# Patient Record
Sex: MALE | Race: WHITE | NOT HISPANIC OR LATINO
[De-identification: names, ages, dates, MRNs, and addresses within clinical notes are randomized per-mention and may not be internally consistent; named-entity substitution may affect disease eponyms.]

---

## 2017-05-18 ENCOUNTER — APPOINTMENT (OUTPATIENT)
Dept: CARDIOLOGY | Facility: CLINIC | Age: 77
End: 2017-05-18

## 2017-05-26 ENCOUNTER — APPOINTMENT (OUTPATIENT)
Dept: CARDIOLOGY | Facility: CLINIC | Age: 77
End: 2017-05-26

## 2017-09-05 ENCOUNTER — APPOINTMENT (OUTPATIENT)
Dept: CARDIOLOGY | Facility: CLINIC | Age: 77
End: 2017-09-05
Payer: MEDICARE

## 2017-09-05 PROCEDURE — 93000 ELECTROCARDIOGRAM COMPLETE: CPT

## 2017-09-05 PROCEDURE — 99214 OFFICE O/P EST MOD 30 MIN: CPT

## 2017-09-13 ENCOUNTER — APPOINTMENT (OUTPATIENT)
Dept: CARDIOLOGY | Facility: CLINIC | Age: 77
End: 2017-09-13
Payer: MEDICARE

## 2017-09-13 PROCEDURE — 93000 ELECTROCARDIOGRAM COMPLETE: CPT

## 2017-09-13 PROCEDURE — 99214 OFFICE O/P EST MOD 30 MIN: CPT

## 2017-09-26 ENCOUNTER — APPOINTMENT (OUTPATIENT)
Dept: CARDIOLOGY | Facility: CLINIC | Age: 77
End: 2017-09-26
Payer: MEDICARE

## 2017-09-26 PROCEDURE — 93979 VASCULAR STUDY: CPT

## 2017-09-26 PROCEDURE — 93015 CV STRESS TEST SUPVJ I&R: CPT

## 2017-09-26 PROCEDURE — 93306 TTE W/DOPPLER COMPLETE: CPT

## 2017-09-26 PROCEDURE — 93880 EXTRACRANIAL BILAT STUDY: CPT

## 2017-09-26 PROCEDURE — A9502: CPT

## 2017-09-26 PROCEDURE — 78452 HT MUSCLE IMAGE SPECT MULT: CPT

## 2017-10-06 ENCOUNTER — APPOINTMENT (OUTPATIENT)
Dept: CARDIOLOGY | Facility: CLINIC | Age: 77
End: 2017-10-06
Payer: MEDICARE

## 2017-10-06 PROCEDURE — 99214 OFFICE O/P EST MOD 30 MIN: CPT

## 2017-10-13 ENCOUNTER — APPOINTMENT (OUTPATIENT)
Dept: CARDIOLOGY | Facility: CLINIC | Age: 77
End: 2017-10-13
Payer: MEDICARE

## 2017-10-13 PROCEDURE — 99213 OFFICE O/P EST LOW 20 MIN: CPT

## 2017-11-10 ENCOUNTER — RX RENEWAL (OUTPATIENT)
Age: 77
End: 2017-11-10

## 2017-11-27 ENCOUNTER — APPOINTMENT (OUTPATIENT)
Dept: CARDIOLOGY | Facility: CLINIC | Age: 77
End: 2017-11-27

## 2017-11-29 ENCOUNTER — RECORD ABSTRACTING (OUTPATIENT)
Age: 77
End: 2017-11-29

## 2017-11-29 DIAGNOSIS — Z87.891 PERSONAL HISTORY OF NICOTINE DEPENDENCE: ICD-10-CM

## 2017-11-29 DIAGNOSIS — Z80.3 FAMILY HISTORY OF MALIGNANT NEOPLASM OF BREAST: ICD-10-CM

## 2017-11-29 DIAGNOSIS — M19.90 UNSPECIFIED OSTEOARTHRITIS, UNSPECIFIED SITE: ICD-10-CM

## 2017-11-29 DIAGNOSIS — Z80.1 FAMILY HISTORY OF MALIGNANT NEOPLASM OF TRACHEA, BRONCHUS AND LUNG: ICD-10-CM

## 2017-11-29 DIAGNOSIS — Z86.79 PERSONAL HISTORY OF OTHER DISEASES OF THE CIRCULATORY SYSTEM: ICD-10-CM

## 2017-11-29 DIAGNOSIS — Z86.39 PERSONAL HISTORY OF OTHER ENDOCRINE, NUTRITIONAL AND METABOLIC DISEASE: ICD-10-CM

## 2017-11-29 DIAGNOSIS — G43.909 MIGRAINE, UNSPECIFIED, NOT INTRACTABLE, W/OUT STATUS MIGRAINOSUS: ICD-10-CM

## 2017-11-29 DIAGNOSIS — I36.1 NONRHEUMATIC TRICUSPID (VALVE) INSUFFICIENCY: ICD-10-CM

## 2017-11-29 DIAGNOSIS — I34.0 NONRHEUMATIC MITRAL (VALVE) INSUFFICIENCY: ICD-10-CM

## 2017-11-29 DIAGNOSIS — Z87.09 PERSONAL HISTORY OF OTHER DISEASES OF THE RESPIRATORY SYSTEM: ICD-10-CM

## 2017-11-29 DIAGNOSIS — Z82.49 FAMILY HISTORY OF ISCHEMIC HEART DISEASE AND OTHER DISEASES OF THE CIRCULATORY SYSTEM: ICD-10-CM

## 2017-11-29 RX ORDER — ALBUTEROL SULFATE 90 UG/1
108 (90 BASE) AEROSOL, METERED RESPIRATORY (INHALATION)
Refills: 0 | Status: ACTIVE | COMMUNITY

## 2017-11-29 RX ORDER — UBIDECARENONE/VIT E ACET 100MG-5
100 CAPSULE ORAL
Refills: 0 | Status: ACTIVE | COMMUNITY

## 2017-11-29 RX ORDER — ASCORBIC ACID 125 MG
306 TABLET,CHEWABLE ORAL
Refills: 0 | Status: ACTIVE | COMMUNITY

## 2017-11-29 RX ORDER — MULTIVIT-MIN/FOLIC/VIT K/LYCOP 400-300MCG
1000 TABLET ORAL DAILY
Refills: 0 | Status: ACTIVE | COMMUNITY

## 2017-11-29 RX ORDER — MONTELUKAST SODIUM 10 MG/1
10 TABLET, FILM COATED ORAL DAILY
Refills: 0 | Status: ACTIVE | COMMUNITY

## 2017-11-30 ENCOUNTER — OUTPATIENT (OUTPATIENT)
Dept: OUTPATIENT SERVICES | Facility: HOSPITAL | Age: 77
LOS: 1 days | End: 2017-11-30

## 2017-12-01 ENCOUNTER — APPOINTMENT (OUTPATIENT)
Dept: CARDIOLOGY | Facility: CLINIC | Age: 77
End: 2017-12-01
Payer: MEDICARE

## 2017-12-01 VITALS
HEIGHT: 72 IN | DIASTOLIC BLOOD PRESSURE: 70 MMHG | HEART RATE: 70 BPM | WEIGHT: 245 LBS | SYSTOLIC BLOOD PRESSURE: 140 MMHG | BODY MASS INDEX: 33.18 KG/M2

## 2017-12-01 PROCEDURE — 99214 OFFICE O/P EST MOD 30 MIN: CPT

## 2017-12-08 ENCOUNTER — OUTPATIENT (OUTPATIENT)
Dept: OUTPATIENT SERVICES | Facility: HOSPITAL | Age: 77
LOS: 1 days | End: 2017-12-08

## 2017-12-08 ENCOUNTER — INPATIENT (INPATIENT)
Facility: HOSPITAL | Age: 77
LOS: 2 days | Discharge: HOME | End: 2017-12-11
Payer: MEDICARE

## 2017-12-08 PROCEDURE — 72170 X-RAY EXAM OF PELVIS: CPT | Mod: 26,59

## 2017-12-08 PROCEDURE — 73501 X-RAY EXAM HIP UNI 1 VIEW: CPT | Mod: 26,LT

## 2017-12-09 ENCOUNTER — OUTPATIENT (OUTPATIENT)
Dept: OUTPATIENT SERVICES | Facility: HOSPITAL | Age: 77
LOS: 1 days | End: 2017-12-09

## 2017-12-10 ENCOUNTER — OUTPATIENT (OUTPATIENT)
Dept: OUTPATIENT SERVICES | Facility: HOSPITAL | Age: 77
LOS: 1 days | End: 2017-12-10

## 2017-12-11 ENCOUNTER — OUTPATIENT (OUTPATIENT)
Dept: OUTPATIENT SERVICES | Facility: HOSPITAL | Age: 77
LOS: 1 days | End: 2017-12-11

## 2018-01-19 ENCOUNTER — APPOINTMENT (OUTPATIENT)
Dept: CARDIOLOGY | Facility: CLINIC | Age: 78
End: 2018-01-19

## 2018-01-26 ENCOUNTER — APPOINTMENT (OUTPATIENT)
Dept: CARDIOLOGY | Facility: CLINIC | Age: 78
End: 2018-01-26

## 2018-03-02 ENCOUNTER — APPOINTMENT (OUTPATIENT)
Dept: CARDIOLOGY | Facility: CLINIC | Age: 78
End: 2018-03-02
Payer: MEDICARE

## 2018-03-02 VITALS — HEIGHT: 72 IN | HEART RATE: 70 BPM | SYSTOLIC BLOOD PRESSURE: 120 MMHG | DIASTOLIC BLOOD PRESSURE: 74 MMHG

## 2018-03-02 PROCEDURE — 99214 OFFICE O/P EST MOD 30 MIN: CPT

## 2018-03-02 RX ORDER — PREDNISONE 20 MG/1
20 TABLET ORAL
Qty: 20 | Refills: 0 | Status: DISCONTINUED | COMMUNITY
Start: 2017-06-14 | End: 2018-03-02

## 2018-03-02 RX ORDER — DILTIAZEM HYDROCHLORIDE 180 MG/1
180 CAPSULE, COATED, EXTENDED RELEASE ORAL
Refills: 0 | Status: DISCONTINUED | COMMUNITY
End: 2018-03-02

## 2018-03-02 RX ORDER — CIPROFLOXACIN HYDROCHLORIDE 500 MG/1
500 TABLET, FILM COATED ORAL
Qty: 14 | Refills: 0 | Status: DISCONTINUED | COMMUNITY
Start: 2017-11-11 | End: 2018-03-02

## 2018-03-02 RX ORDER — CEFDINIR 300 MG/1
300 CAPSULE ORAL
Qty: 6 | Refills: 0 | Status: DISCONTINUED | COMMUNITY
Start: 2017-06-14 | End: 2018-03-02

## 2018-03-02 RX ORDER — MOMETASONE FUROATE MONOHYDRATE 50 UG/1
50 SPRAY, METERED NASAL
Refills: 0 | Status: DISCONTINUED | COMMUNITY
End: 2018-03-02

## 2018-03-02 RX ORDER — CETIRIZINE HCL 10 MG
10 TABLET ORAL DAILY
Refills: 0 | Status: DISCONTINUED | COMMUNITY
End: 2018-03-02

## 2018-03-02 RX ORDER — ZOLPIDEM TARTRATE 10 MG/1
10 TABLET, FILM COATED ORAL
Refills: 0 | Status: DISCONTINUED | COMMUNITY
End: 2018-03-02

## 2018-03-02 RX ORDER — PREDNISONE 10 MG/1
10 TABLET ORAL
Qty: 80 | Refills: 0 | Status: DISCONTINUED | COMMUNITY
Start: 2017-07-03 | End: 2018-03-02

## 2018-03-02 RX ORDER — OMALIZUMAB 202.5 MG/1.4ML
150 INJECTION, SOLUTION SUBCUTANEOUS
Refills: 0 | Status: DISCONTINUED | COMMUNITY
End: 2018-03-02

## 2018-03-02 RX ORDER — NEOMYCIN AND POLYMYXIN B SULFATES AND HYDROCORTISONE OTIC 10; 3.5; 1 MG/ML; MG/ML; [USP'U]/ML
3.5-10000-1 SUSPENSION AURICULAR (OTIC)
Qty: 10 | Refills: 0 | Status: DISCONTINUED | COMMUNITY
Start: 2017-11-11 | End: 2018-03-02

## 2018-04-26 ENCOUNTER — RX RENEWAL (OUTPATIENT)
Age: 78
End: 2018-04-26

## 2018-06-08 ENCOUNTER — APPOINTMENT (OUTPATIENT)
Dept: CARDIOLOGY | Facility: CLINIC | Age: 78
End: 2018-06-08
Payer: MEDICARE

## 2018-06-08 VITALS
OXYGEN SATURATION: 94 % | SYSTOLIC BLOOD PRESSURE: 126 MMHG | HEART RATE: 58 BPM | DIASTOLIC BLOOD PRESSURE: 70 MMHG | WEIGHT: 236 LBS | BODY MASS INDEX: 31.97 KG/M2 | HEIGHT: 72 IN

## 2018-06-08 DIAGNOSIS — H53.2 DIPLOPIA: ICD-10-CM

## 2018-06-08 PROCEDURE — 99214 OFFICE O/P EST MOD 30 MIN: CPT

## 2018-06-26 ENCOUNTER — NON-APPOINTMENT (OUTPATIENT)
Age: 78
End: 2018-06-26

## 2018-06-26 ENCOUNTER — APPOINTMENT (OUTPATIENT)
Dept: ELECTROPHYSIOLOGY | Facility: CLINIC | Age: 78
End: 2018-06-26
Payer: MEDICARE

## 2018-06-26 VITALS
WEIGHT: 236 LBS | OXYGEN SATURATION: 98 % | HEIGHT: 72 IN | DIASTOLIC BLOOD PRESSURE: 70 MMHG | BODY MASS INDEX: 31.97 KG/M2 | SYSTOLIC BLOOD PRESSURE: 120 MMHG | HEART RATE: 64 BPM

## 2018-06-26 PROCEDURE — 99213 OFFICE O/P EST LOW 20 MIN: CPT

## 2018-06-26 PROCEDURE — 93000 ELECTROCARDIOGRAM COMPLETE: CPT

## 2018-09-07 ENCOUNTER — APPOINTMENT (OUTPATIENT)
Dept: CARDIOLOGY | Facility: CLINIC | Age: 78
End: 2018-09-07
Payer: MEDICARE

## 2018-09-07 VITALS
SYSTOLIC BLOOD PRESSURE: 112 MMHG | HEART RATE: 39 BPM | WEIGHT: 236 LBS | HEIGHT: 72 IN | OXYGEN SATURATION: 94 % | DIASTOLIC BLOOD PRESSURE: 52 MMHG | BODY MASS INDEX: 31.97 KG/M2

## 2018-09-07 PROCEDURE — 99214 OFFICE O/P EST MOD 30 MIN: CPT

## 2018-09-07 RX ORDER — DILTIAZEM HYDROCHLORIDE 300 MG/1
300 CAPSULE, EXTENDED RELEASE ORAL
Qty: 90 | Refills: 0 | Status: DISCONTINUED | COMMUNITY
Start: 2018-06-04 | End: 2018-09-07

## 2018-09-07 RX ORDER — FUROSEMIDE 20 MG/1
20 TABLET ORAL
Qty: 180 | Refills: 0 | Status: DISCONTINUED | COMMUNITY
Start: 2017-10-02 | End: 2018-09-07

## 2018-09-07 RX ORDER — DILTIAZEM HYDROCHLORIDE 240 MG/1
240 CAPSULE, EXTENDED RELEASE ORAL
Qty: 90 | Refills: 0 | Status: DISCONTINUED | COMMUNITY
Start: 2018-03-23 | End: 2018-09-07

## 2018-09-21 ENCOUNTER — APPOINTMENT (OUTPATIENT)
Dept: CARDIOLOGY | Facility: CLINIC | Age: 78
End: 2018-09-21
Payer: MEDICARE

## 2018-09-21 VITALS
HEIGHT: 72 IN | BODY MASS INDEX: 31.83 KG/M2 | HEART RATE: 38 BPM | OXYGEN SATURATION: 96 % | WEIGHT: 235 LBS | SYSTOLIC BLOOD PRESSURE: 140 MMHG | DIASTOLIC BLOOD PRESSURE: 58 MMHG

## 2018-09-21 PROCEDURE — 99214 OFFICE O/P EST MOD 30 MIN: CPT

## 2018-09-21 RX ORDER — OMALIZUMAB 202.5 MG/1.4ML
150 INJECTION, SOLUTION SUBCUTANEOUS
Refills: 0 | Status: ACTIVE | COMMUNITY

## 2018-09-21 RX ORDER — DILTIAZEM HYDROCHLORIDE 180 MG/1
180 CAPSULE, EXTENDED RELEASE ORAL DAILY
Refills: 0 | Status: DISCONTINUED | COMMUNITY
End: 2018-09-21

## 2018-09-26 PROCEDURE — 93224 XTRNL ECG REC UP TO 48 HRS: CPT

## 2018-10-04 ENCOUNTER — APPOINTMENT (OUTPATIENT)
Dept: ELECTROPHYSIOLOGY | Facility: CLINIC | Age: 78
End: 2018-10-04
Payer: MEDICARE

## 2018-10-04 VITALS
SYSTOLIC BLOOD PRESSURE: 116 MMHG | HEIGHT: 72 IN | WEIGHT: 232 LBS | BODY MASS INDEX: 31.42 KG/M2 | HEART RATE: 44 BPM | DIASTOLIC BLOOD PRESSURE: 72 MMHG

## 2018-10-04 PROCEDURE — 93000 ELECTROCARDIOGRAM COMPLETE: CPT

## 2018-10-04 PROCEDURE — 99205 OFFICE O/P NEW HI 60 MIN: CPT

## 2018-10-10 ENCOUNTER — OUTPATIENT (OUTPATIENT)
Dept: OUTPATIENT SERVICES | Facility: HOSPITAL | Age: 78
LOS: 1 days | End: 2018-10-10
Payer: MEDICARE

## 2018-10-10 PROCEDURE — 71046 X-RAY EXAM CHEST 2 VIEWS: CPT | Mod: 26

## 2018-10-11 ENCOUNTER — OUTPATIENT (OUTPATIENT)
Dept: OUTPATIENT SERVICES | Facility: HOSPITAL | Age: 78
LOS: 1 days | Discharge: HOME | End: 2018-10-11
Payer: MEDICARE

## 2018-10-11 ENCOUNTER — OUTPATIENT (OUTPATIENT)
Dept: OUTPATIENT SERVICES | Facility: HOSPITAL | Age: 78
LOS: 1 days | End: 2018-10-11

## 2018-10-11 PROCEDURE — 33207 INSERT HEART PM VENTRICULAR: CPT

## 2018-10-12 ENCOUNTER — OUTPATIENT (OUTPATIENT)
Dept: OUTPATIENT SERVICES | Facility: HOSPITAL | Age: 78
LOS: 1 days | End: 2018-10-12

## 2018-10-12 PROCEDURE — 71046 X-RAY EXAM CHEST 2 VIEWS: CPT | Mod: 26

## 2018-10-26 ENCOUNTER — APPOINTMENT (OUTPATIENT)
Dept: CARDIOLOGY | Facility: CLINIC | Age: 78
End: 2018-10-26
Payer: MEDICARE

## 2018-10-26 ENCOUNTER — APPOINTMENT (OUTPATIENT)
Dept: CARDIOLOGY | Facility: CLINIC | Age: 78
End: 2018-10-26

## 2018-10-26 VITALS
HEIGHT: 72 IN | WEIGHT: 230 LBS | SYSTOLIC BLOOD PRESSURE: 122 MMHG | BODY MASS INDEX: 31.15 KG/M2 | DIASTOLIC BLOOD PRESSURE: 62 MMHG | HEART RATE: 70 BPM

## 2018-10-26 DIAGNOSIS — H53.2 DIPLOPIA: ICD-10-CM

## 2018-10-26 PROCEDURE — 99215 OFFICE O/P EST HI 40 MIN: CPT

## 2018-10-26 PROCEDURE — 93279 PRGRMG DEV EVAL PM/LDLS PM: CPT

## 2018-10-26 RX ORDER — LISINOPRIL 5 MG/1
5 TABLET ORAL DAILY
Qty: 90 | Refills: 1 | Status: DISCONTINUED | COMMUNITY
Start: 2018-06-06 | End: 2018-10-26

## 2019-01-16 ENCOUNTER — APPOINTMENT (OUTPATIENT)
Dept: CARDIOLOGY | Facility: CLINIC | Age: 79
End: 2019-01-16

## 2019-01-24 ENCOUNTER — APPOINTMENT (OUTPATIENT)
Dept: CARDIOLOGY | Facility: CLINIC | Age: 79
End: 2019-01-24

## 2019-02-04 ENCOUNTER — RECORD ABSTRACTING (OUTPATIENT)
Age: 79
End: 2019-02-04

## 2019-02-08 ENCOUNTER — APPOINTMENT (OUTPATIENT)
Dept: CARDIOLOGY | Facility: CLINIC | Age: 79
End: 2019-02-08
Payer: MEDICARE

## 2019-02-08 ENCOUNTER — RECORD ABSTRACTING (OUTPATIENT)
Age: 79
End: 2019-02-08

## 2019-02-08 VITALS
BODY MASS INDEX: 32.51 KG/M2 | HEART RATE: 90 BPM | DIASTOLIC BLOOD PRESSURE: 70 MMHG | WEIGHT: 240 LBS | OXYGEN SATURATION: 95 % | SYSTOLIC BLOOD PRESSURE: 120 MMHG | HEIGHT: 72 IN

## 2019-02-08 PROCEDURE — 99214 OFFICE O/P EST MOD 30 MIN: CPT

## 2019-02-08 PROCEDURE — 93279 PRGRMG DEV EVAL PM/LDLS PM: CPT

## 2019-02-08 NOTE — DISCUSSION/SUMMARY
[FreeTextEntry1] : Hosea is an 78-year-old male with medical history detailed above and active medical issues including: \par  \par - Medtronic PPM implant 10/11/18 for SSS/PAF on Eliquis, cardizem . Normal PPM check today, next PPM check 3 months.\par \par -  Asymptomatic PAF elective DCCV 8/29/17 and  DCCV 2013 maintaining NSR on Eliquis diltiazem \par \par - Chronic  Dyspnea on exertion unchanged in setting of COPD, medium IMI,  no ischemia  Lexiscan MPI stress test, normal LVEF Sept 2017.\par  \par ¨ COPD stable, followup with pulmonologist Dr Benoit\par \par -  Admission [facility] diplopia r/o TIA, normal head CT, ENT eval Dr Pereira possible sinus surgery\par ¨ HTN, home blood pressures running at goal less than 130/80  \par \par ¨Dyslipidemia on low dose Crestor 5mg every other day, history of statin myalgia, shoulder pain\par \par ¨ Abnormal EKG, 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 Patient educated on lifestyle and diet modification with low sodium low fat diet and avoidance of excessive alcohol. Patient is aware to call with any symptoms or concerns. \par  \par Patient will be seen in cardiology follow-up 3 months same day PPM check with repeat labs. \par \par Hosea will follow up with Dr. Broussard for primary care\par

## 2019-02-08 NOTE — REASON FOR VISIT
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  Pacemaker will be checked every 3 months. \par \par Cardiovascular review of symptoms is negative for exertional chest pain,  palpitations, dizziness or syncope. No PND or orthopnea leg edema. No bleeding or black stool.\par \par Patient is walking 10 minutes without exertional chest pain. Patient has been more physically active walking after replacement surgery December 2017\par \par Patient seen by Dr Broussard 9/19/18 found to have A. fib ventricular rate 30s diltiazem reduced from 240mg to 180mg daily.  Currently remains in A. fib with slow ventricular rate.  Cardizem will be changed to 30mg twice per day with heart rate and blood pressure check prior to dose.  Patient will hold Cardizem for heart rate less than 50bpm. Holter monitor will be done today. Repeat labs ordered. Patient scheduled to followup with electrophysiologist Dr Burch. \par \par Patient admitted  [facility] diploplia r/o TIA, NSVT, normal LVEF, asymptomatic AF on Eliquis, cardizem. Cardiology was consulted for brief asymptomatic NSVT, Baseline AFib controlled ventricular rate.   2-D echo was repeated with normal LVEF 60%.  No change in medications\par Patient had asymptomatic recurrence of A. fib and successful elective DC cardioversion [facility] 8/29/17. EKG remains sinus rhythm first degree AV block LAD, RBBB, age undetermined IMI and AMI\par \par Patient had coughing and wheezing bronchitis and COPD exacerbation August 2017, following with pulmonologist Dr. Contreras.\par \par Lexascan Myoview stress September 2017, LVEF 50%, medium size inferior infarct without ischemia, unchanged, no anginal symptoms, baseline sinus rhythm, IVCD, LAD, old ASWMI\par \par 2-D echo [facility] 6/5/18 LVEF 60% mild MR and TR, normal PASP\par \par 2-D echo September 2017, LVEF 60%, mild diastolic dysfunction, mild MR TR, normal PASP\par \par Carotid and abdominal ultrasound September 2017, nonobstructive plaque, proximal aorta 2.7 centimeter\par

## 2019-02-08 NOTE — REVIEW OF SYSTEMS
[Dyspnea on exertion] : dyspnea during exertion [Joint Pain] : joint pain [Joint Stiffness] : joint stiffness [Negative] : Heme/Lymph [Chest  Pressure] : no chest pressure [Chest Pain] : no chest pain [Lower Ext Edema] : no extremity edema [Leg Claudication] : no intermittent leg claudication [Palpitations] : no palpitations [Joint Swelling] : no joint swelling [Muscle Cramps] : no muscle cramps [Limb Weakness (Paresis)] : no limb weakness

## 2019-02-08 NOTE — PHYSICAL EXAM
[General Appearance - Well Developed] : well developed [Normal Appearance] : normal appearance [Well Groomed] : well groomed [General Appearance - Well Nourished] : well nourished [No Deformities] : no deformities [General Appearance - In No Acute Distress] : no acute distress [Normal Conjunctiva] : the conjunctiva exhibited no abnormalities [Eyelids - No Xanthelasma] : the eyelids demonstrated no xanthelasmas [Normal Oral Mucosa] : normal oral mucosa [No Oral Pallor] : no oral pallor [No Oral Cyanosis] : no oral cyanosis [Normal Jugular Venous A Waves Present] : normal jugular venous A waves present [Normal Jugular Venous V Waves Present] : normal jugular venous V waves present [No Jugular Venous Solano A Waves] : no jugular venous solano A waves [Respiration, Rhythm And Depth] : normal respiratory rhythm and effort [Exaggerated Use Of Accessory Muscles For Inspiration] : no accessory muscle use [Auscultation Breath Sounds / Voice Sounds] : lungs were clear to auscultation bilaterally [Murmurs] : no murmurs present [Abdomen Soft] : soft [Abdomen Tenderness] : non-tender [Abdomen Mass (___ Cm)] : no abdominal mass palpated [Abnormal Walk] : normal gait [Gait - Sufficient For Exercise Testing] : the gait was sufficient for exercise testing [Nail Clubbing] : no clubbing of the fingernails [Cyanosis, Localized] : no localized cyanosis [Petechial Hemorrhages (___cm)] : no petechial hemorrhages [] : no ischemic changes [Oriented To Time, Place, And Person] : oriented to person, place, and time [Affect] : the affect was normal [Mood] : the mood was normal [No Anxiety] : not feeling anxious [FreeTextEntry1] : improved walking after hip replacement surgery Dec 2017

## 2019-02-08 NOTE — REASON FOR VISIT
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  Pacemaker will be checked every 3 months. \par \par Cardiovascular review of symptoms is negative for exertional chest pain,  palpitations, dizziness or syncope. No PND or orthopnea leg edema. No bleeding or black stool.\par \par Patient is walking 10 minutes without exertional chest pain. Patient has been more physically active walking after replacement surgery December 2017\par \par Patient seen by Dr Broussard 9/19/18 found to have A. fib ventricular rate 30s diltiazem reduced from 240mg to 180mg daily.  Currently remains in A. fib with slow ventricular rate.  Cardizem will be changed to 30mg twice per day with heart rate and blood pressure check prior to dose.  Patient will hold Cardizem for heart rate less than 50bpm. Holter monitor will be done today. Repeat labs ordered. Patient scheduled to followup with electrophysiologist Dr Burch. \par \par Patient admitted  [facility] diploplia r/o TIA, NSVT, normal LVEF, asymptomatic AF on Eliquis, cardizem. Cardiology was consulted for brief asymptomatic NSVT, Baseline AFib controlled ventricular rate.   2-D echo was repeated with normal LVEF 60%.  No change in medications\par Patient had asymptomatic recurrence of A. fib and successful elective DC cardioversion [facility] 8/29/17. EKG remains sinus rhythm first degree AV block LAD, RBBB, age undetermined IMI and AMI\par \par Patient had coughing and wheezing bronchitis and COPD exacerbation August 2017, following with pulmonologist Dr. Contreras.\par \par Lexascan Myoview stress September 2017, LVEF 50%, medium size inferior infarct without ischemia, unchanged, no anginal symptoms, baseline sinus rhythm, IVCD, LAD, old ASWMI\par \par 2-D echo [facility] 6/5/18 LVEF 60% mild MR and TR, normal PASP\par \par 2-D echo September 2017, LVEF 60%, mild diastolic dysfunction, mild MR TR, normal PASP\par \par Carotid and abdominal ultrasound September 2017, nonobstructive plaque, proximal aorta 2.7 centimeter\par

## 2019-02-08 NOTE — DISCUSSION/SUMMARY
[FreeTextEntry1] : Hosea is an 78-year-old male with medical history detailed above and active medical issues including: \par  \par - Medtronic PPM implant 10/11/18 for SSS/PAF on Eliquis, cardizem . Normal PPM check today, next check 3 months\par \par -  Asymptomatic PAF elective DCCV 8/29/17 and  DCCV 2013 maintaining NSR on Eliquis diltiazem \par \par - Chronic  Dyspnea on exertion unchanged in setting of COPD, medium IMI,  no ischemia  Lexiscan MPI stress test, normal LVEF Sept 2017.\par  \par ¨ COPD stable, followup with pulmonologist Dr Benoit\par \par -  Admission [facility] diplopia r/o TIA, normal head CT, ENT eval Dr Pereira possible sinus surgery\par ¨ HTN, home blood pressures running at goal less than 130/80  \par \par ¨Dyslipidemia on low dose Crestor 5mg every other day, history of statin myalgia, shoulder pain\par \par ¨ Abnormal EKG, 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 Patient educated on lifestyle and diet modification with low sodium low fat diet and avoidance of excessive alcohol. Patient is aware to call with any symptoms or concerns. \par  \par Patient will be seen in cardiology follow-up 3 months same day PPM check with repeat labs. \par \par Hosea will follow up with Dr. Broussard for primary care\par

## 2019-02-08 NOTE — PHYSICAL EXAM
[General Appearance - Well Developed] : well developed [Normal Appearance] : normal appearance [Well Groomed] : well groomed [General Appearance - Well Nourished] : well nourished [No Deformities] : no deformities [General Appearance - In No Acute Distress] : no acute distress [Murmurs] : no murmurs present [Respiration, Rhythm And Depth] : normal respiratory rhythm and effort [Exaggerated Use Of Accessory Muscles For Inspiration] : no accessory muscle use [Auscultation Breath Sounds / Voice Sounds] : lungs were clear to auscultation bilaterally [Abdomen Soft] : soft [Abdomen Tenderness] : non-tender [Abdomen Mass (___ Cm)] : no abdominal mass palpated [Nail Clubbing] : no clubbing of the fingernails [Cyanosis, Localized] : no localized cyanosis [Petechial Hemorrhages (___cm)] : no petechial hemorrhages [] : no ischemic changes [Normal Conjunctiva] : the conjunctiva exhibited no abnormalities [Eyelids - No Xanthelasma] : the eyelids demonstrated no xanthelasmas [Normal Oral Mucosa] : normal oral mucosa [No Oral Pallor] : no oral pallor [No Oral Cyanosis] : no oral cyanosis [Normal Jugular Venous A Waves Present] : normal jugular venous A waves present [Normal Jugular Venous V Waves Present] : normal jugular venous V waves present [No Jugular Venous Solano A Waves] : no jugular venous solano A waves [Abnormal Walk] : normal gait [Gait - Sufficient For Exercise Testing] : the gait was sufficient for exercise testing [Oriented To Time, Place, And Person] : oriented to person, place, and time [Affect] : the affect was normal [Mood] : the mood was normal [No Anxiety] : not feeling anxious [FreeTextEntry1] : improved walking after hip replacement surgery Dec 2017

## 2019-03-22 ENCOUNTER — APPOINTMENT (OUTPATIENT)
Dept: CARDIOLOGY | Facility: CLINIC | Age: 79
End: 2019-03-22

## 2019-04-22 ENCOUNTER — MEDICATION RENEWAL (OUTPATIENT)
Age: 79
End: 2019-04-22

## 2019-04-22 ENCOUNTER — RX RENEWAL (OUTPATIENT)
Age: 79
End: 2019-04-22

## 2019-05-21 ENCOUNTER — APPOINTMENT (OUTPATIENT)
Dept: CARDIOLOGY | Facility: CLINIC | Age: 79
End: 2019-05-21
Payer: MEDICARE

## 2019-05-21 PROCEDURE — 0296T: CPT

## 2019-05-21 PROCEDURE — 93279 PRGRMG DEV EVAL PM/LDLS PM: CPT

## 2019-05-21 NOTE — PROCEDURE
[Sinus Bradycardia] : sinus bradycardia [Pacemaker] : pacemaker [VVIR] : VVIR [Voltage: ___ volts] : Voltage was [unfilled] volts [Threshold Testing Performed] : Threshold testing was performed [Lead Imp:  ___ohms] : lead impedance was [unfilled] ohms [Sensing Amplitude ___mv] : sensing amplitude was [unfilled] mv [___V @] : [unfilled] V [___ ms] : [unfilled] ms [None] : none [de-identified] : Medtronic [de-identified] : Dee MENJIVAR SR MRI [id number] [de-identified] : [id number] [de-identified] : 10/11/2018 [de-identified] :  [de-identified] : 11.9 years [de-identified] : 0.90mv  2.0V/0.4ms\par \par : 99.4%\par \par Several episodes of reported NSVT.  Longest 3 seconds.  Fastest 211 BPM.  Astymptomatic.  Recent labs with PMD.  Last echo 6/18 EF: 60%.  \par \par 2 week Zio patch.  Labs from PMD requested.  Repeat Echo.  3 month device check.

## 2019-05-21 NOTE — PROCEDURE
[Sinus Bradycardia] : sinus bradycardia [Pacemaker] : pacemaker [VVIR] : VVIR [Voltage: ___ volts] : Voltage was [unfilled] volts [Threshold Testing Performed] : Threshold testing was performed [Lead Imp:  ___ohms] : lead impedance was [unfilled] ohms [Sensing Amplitude ___mv] : sensing amplitude was [unfilled] mv [___V @] : [unfilled] V [___ ms] : [unfilled] ms [None] : none [de-identified] : Medtronic [de-identified] : Dee MENJIVAR SR MRI [id number] [de-identified] : [id number] [de-identified] : 10/11/2018 [de-identified] :  [de-identified] : 11.9 years [de-identified] : 0.90mv  2.0V/0.4ms\par \par : 99.4%\par \par Several episodes of reported NSVT.  Longest 3 seconds.  Fastest 211 BPM.  Astymptomatic.  Recent labs with PMD.  Last echo 6/18 EF: 60%.  \par \par 2 week Zio patch.  Labs from PMD requested.  Repeat Echo.  3 month device check.

## 2019-05-21 NOTE — PHYSICAL EXAM
[General Appearance - Well Developed] : well developed [Normal Appearance] : normal appearance [Well Groomed] : well groomed [General Appearance - Well Nourished] : well nourished [No Deformities] : no deformities [General Appearance - In No Acute Distress] : no acute distress [Murmurs] : no murmurs present [Respiration, Rhythm And Depth] : normal respiratory rhythm and effort [Exaggerated Use Of Accessory Muscles For Inspiration] : no accessory muscle use [Auscultation Breath Sounds / Voice Sounds] : lungs were clear to auscultation bilaterally [Abdomen Soft] : soft [Abdomen Tenderness] : non-tender [Abdomen Mass (___ Cm)] : no abdominal mass palpated [Nail Clubbing] : no clubbing of the fingernails [Cyanosis, Localized] : no localized cyanosis [Petechial Hemorrhages (___cm)] : no petechial hemorrhages [] : no ischemic changes [Normal Conjunctiva] : the conjunctiva exhibited no abnormalities [Eyelids - No Xanthelasma] : the eyelids demonstrated no xanthelasmas [Normal Oral Mucosa] : normal oral mucosa [No Oral Pallor] : no oral pallor [No Oral Cyanosis] : no oral cyanosis [Normal Jugular Venous A Waves Present] : normal jugular venous A waves present [Normal Jugular Venous V Waves Present] : normal jugular venous V waves present [No Jugular Venous Solano A Waves] : no jugular venous solano A waves [Abnormal Walk] : normal gait [Gait - Sufficient For Exercise Testing] : the gait was sufficient for exercise testing [FreeTextEntry1] : improved walking after hip replacement surgery Dec 2017 [Oriented To Time, Place, And Person] : oriented to person, place, and time [Affect] : the affect was normal [Mood] : the mood was normal [No Anxiety] : not feeling anxious

## 2019-05-21 NOTE — REASON FOR VISIT
[Follow-Up - Clinic] : a clinic follow-up of [Pacemaker Evaluation] : pacemaker ~T evaluation ~C was performed [FreeTextEntry2] : Medtronic PPM implant 10/11/18 for SSS/PAF on Eliquis, cardizem [FreeTextEntry1] : Hosea is a 78-year-old male with a history of hypertension, dyslipidemia, TIA, COPD, migraine, allergy to aspirin with anaphylaxis, left hip JANE Dr Sultan [facility] 12/8/17, nonobstructive cath 2007, small inferior and apical MI Myoview stress 5/15, RBBB, asymptomatic rapid atrial fibrillation CHADS score is 6, successful DC cardioversion in May 2013, Medtronic PPM implant 10/11/18 for SSS/PAF on Eliquis, cardizem .\par \par Pacemaker check today reveals normally functioning pacemaker adequate thresholds and battery.  Pacemaker will be checked every 3 months. \par \par Cardiovascular review of symptoms is negative for exertional chest pain,  palpitations, dizziness or syncope. No PND or orthopnea leg edema. No bleeding or black stool.\par \par Patient is walking 10 minutes without exertional chest pain. Patient has been more physically active walking after replacement surgery December 2017\par \par Patient seen by Dr Broussard 9/19/18 found to have A. fib ventricular rate 30s diltiazem reduced from 240mg to 180mg daily.  Currently remains in A. fib with slow ventricular rate.  Cardizem will be changed to 30mg twice per day with heart rate and blood pressure check prior to dose.  Patient will hold Cardizem for heart rate less than 50bpm. Holter monitor will be done today. Repeat labs ordered. Patient scheduled to followup with electrophysiologist Dr Burch. \par \par Patient admitted  [facility] diploplia r/o TIA, NSVT, normal LVEF, asymptomatic AF on Eliquis, cardizem. Cardiology was consulted for brief asymptomatic NSVT, Baseline AFib controlled ventricular rate.   2-D echo was repeated with normal LVEF 60%.  No change in medications\par Patient had asymptomatic recurrence of A. fib and successful elective DC cardioversion [facility] 8/29/17. EKG remains sinus rhythm first degree AV block LAD, RBBB, age undetermined IMI and AMI\par \par Patient had coughing and wheezing bronchitis and COPD exacerbation August 2017, following with pulmonologist Dr. Contreras.\par \par Lexascan Myoview stress September 2017, LVEF 50%, medium size inferior infarct without ischemia, unchanged, no anginal symptoms, baseline sinus rhythm, IVCD, LAD, old ASWMI\par \par 2-D echo [facility] 6/5/18 LVEF 60% mild MR and TR, normal PASP\par \par 2-D echo September 2017, LVEF 60%, mild diastolic dysfunction, mild MR TR, normal PASP\par \par Carotid and abdominal ultrasound September 2017, nonobstructive plaque, proximal aorta 2.7 centimeter\par  [Follow-up Device Check] : follow-up device check visit

## 2019-05-21 NOTE — REVIEW OF SYSTEMS
[Dyspnea on exertion] : dyspnea during exertion [Chest  Pressure] : no chest pressure [Chest Pain] : no chest pain [Lower Ext Edema] : no extremity edema [Leg Claudication] : no intermittent leg claudication [Palpitations] : no palpitations [Joint Pain] : joint pain [Joint Swelling] : no joint swelling [Joint Stiffness] : joint stiffness [Muscle Cramps] : no muscle cramps [Limb Weakness (Paresis)] : no limb weakness [Negative] : Heme/Lymph

## 2019-05-21 NOTE — REASON FOR VISIT
[Follow-Up - Clinic] : a clinic follow-up of [Pacemaker Evaluation] : pacemaker ~T evaluation ~C was performed [FreeTextEntry2] : Medtronic PPM implant 10/11/18 for SSS/PAF on Eliquis, cardizem [FreeTextEntry1] : Hosea is a 78-year-old male with a history of hypertension, dyslipidemia, TIA, COPD, migraine, allergy to aspirin with anaphylaxis, left hip JANE Dr Sultan [facility] 12/8/17, nonobstructive cath 2007, small inferior and apical MI Myoview stress 5/15, RBBB, asymptomatic rapid atrial fibrillation CHADS score is 6, successful DC cardioversion in May 2013, Medtronic PPM implant 10/11/18 for SSS/PAF on Eliquis, cardizem .\par \par Pacemaker check today reveals normally functioning pacemaker adequate thresholds and battery.  Pacemaker will be checked every 3 months. \par \par Cardiovascular review of symptoms is negative for exertional chest pain,  palpitations, dizziness or syncope. No PND or orthopnea leg edema. No bleeding or black stool.\par \par Patient is walking 10 minutes without exertional chest pain. Patient has been more physically active walking after replacement surgery December 2017\par \par Patient seen by Dr Broussard 9/19/18 found to have A. fib ventricular rate 30s diltiazem reduced from 240mg to 180mg daily.  Currently remains in A. fib with slow ventricular rate.  Cardizem will be changed to 30mg twice per day with heart rate and blood pressure check prior to dose.  Patient will hold Cardizem for heart rate less than 50bpm. Holter monitor will be done today. Repeat labs ordered. Patient scheduled to followup with electrophysiologist Dr Burch. \par \par Patient admitted  [facility] diploplia r/o TIA, NSVT, normal LVEF, asymptomatic AF on Eliquis, cardizem. Cardiology was consulted for brief asymptomatic NSVT, Baseline AFib controlled ventricular rate.   2-D echo was repeated with normal LVEF 60%.  No change in medications\par Patient had asymptomatic recurrence of A. fib and successful elective DC cardioversion [facility] 8/29/17. EKG remains sinus rhythm first degree AV block LAD, RBBB, age undetermined IMI and AMI\par \par Patient had coughing and wheezing bronchitis and COPD exacerbation August 2017, following with pulmonologist Dr. Contreras.\par \par Lexascan Myoview stress September 2017, LVEF 50%, medium size inferior infarct without ischemia, unchanged, no anginal symptoms, baseline sinus rhythm, IVCD, LAD, old ASWMI\par \par 2-D echo [facility] 6/5/18 LVEF 60% mild MR and TR, normal PASP\par \par 2-D echo September 2017, LVEF 60%, mild diastolic dysfunction, mild MR TR, normal PASP\par \par Carotid and abdominal ultrasound September 2017, nonobstructive plaque, proximal aorta 2.7 centimeter\par  [Follow-up Device Check] : follow-up device check visit

## 2019-06-05 ENCOUNTER — APPOINTMENT (OUTPATIENT)
Dept: CARDIOLOGY | Facility: CLINIC | Age: 79
End: 2019-06-05

## 2019-06-07 PROCEDURE — 0298T: CPT

## 2019-06-13 ENCOUNTER — APPOINTMENT (OUTPATIENT)
Dept: CARDIOLOGY | Facility: CLINIC | Age: 79
End: 2019-06-13
Payer: MEDICARE

## 2019-06-13 VITALS
WEIGHT: 236 LBS | DIASTOLIC BLOOD PRESSURE: 76 MMHG | HEART RATE: 81 BPM | BODY MASS INDEX: 31.97 KG/M2 | HEIGHT: 72 IN | SYSTOLIC BLOOD PRESSURE: 122 MMHG | OXYGEN SATURATION: 97 %

## 2019-06-13 PROCEDURE — 99214 OFFICE O/P EST MOD 30 MIN: CPT

## 2019-06-13 NOTE — PHYSICAL EXAM
[General Appearance - Well Developed] : well developed [Well Groomed] : well groomed [Normal Appearance] : normal appearance [General Appearance - Well Nourished] : well nourished [No Deformities] : no deformities [General Appearance - In No Acute Distress] : no acute distress [Normal Conjunctiva] : the conjunctiva exhibited no abnormalities [Eyelids - No Xanthelasma] : the eyelids demonstrated no xanthelasmas [Normal Oral Mucosa] : normal oral mucosa [No Oral Pallor] : no oral pallor [No Oral Cyanosis] : no oral cyanosis [Normal Jugular Venous A Waves Present] : normal jugular venous A waves present [Normal Jugular Venous V Waves Present] : normal jugular venous V waves present [No Jugular Venous Sloano A Waves] : no jugular venous solano A waves [Respiration, Rhythm And Depth] : normal respiratory rhythm and effort [Exaggerated Use Of Accessory Muscles For Inspiration] : no accessory muscle use [Auscultation Breath Sounds / Voice Sounds] : lungs were clear to auscultation bilaterally [Murmurs] : no murmurs present [Abdomen Soft] : soft [Abdomen Tenderness] : non-tender [Abdomen Mass (___ Cm)] : no abdominal mass palpated [Abnormal Walk] : normal gait [Gait - Sufficient For Exercise Testing] : the gait was sufficient for exercise testing [Nail Clubbing] : no clubbing of the fingernails [Petechial Hemorrhages (___cm)] : no petechial hemorrhages [Cyanosis, Localized] : no localized cyanosis [] : no ischemic changes [Oriented To Time, Place, And Person] : oriented to person, place, and time [Affect] : the affect was normal [Mood] : the mood was normal [No Anxiety] : not feeling anxious [FreeTextEntry1] : improved walking after hip replacement surgery Dec 2017

## 2019-06-13 NOTE — DISCUSSION/SUMMARY
[FreeTextEntry1] : Hosea is an 78-year-old male with medical history detailed above and active medical issues including: \par  \par - Dyspnea on exertion, medium IMI,  no ischemia  Patient will have noninvasive testing with a exercise Myoview stress test to assess for obstructive CAD, exercise-induced arrhythmia,  blood pressure response. \par \par - Medtronic PPM implant 10/11/18 for SSS/PAF on Eliquis, cardizem . Normal PPM check today, next check 3 months\par \par - Patient has recent URI on antibiotic and increased nebulizer therapy.  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 Patient educated on lifestyle and diet modification with low sodium low fat diet and avoidance of excessive alcohol. Patient is aware to call with any symptoms or concerns. \par  \par Patient will be seen in cardiology follow-up after noninvasive testing.\par \par Hosea will follow up with Dr. Broussard for primary care\par

## 2019-06-13 NOTE — REASON FOR VISIT
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  Patient has increasing NSVT up to 30 seconds on remote monitoring, started on lopressor 25mg twice daily, well tolerated.\par \par Patient has dyspnea with moderate exertion unchanged. Cardiovascular review of symptoms is negative for exertional chest pain,  palpitations, dizziness or syncope. No PND or orthopnea leg edema. No bleeding or black stool.\par \par Patient is walking 10 minutes without exertional chest pain. Patient has been more physically active walking after replacement surgery December 2017\par \par Patient seen by Dr Broussard 9/19/18 found to have A. fib ventricular rate 30s diltiazem reduced from 240mg to 180mg daily.  Currently remains in A. fib with slow ventricular rate.  Cardizem will be changed to 30mg twice per day with heart rate and blood pressure check prior to dose.  Patient will hold Cardizem for heart rate less than 50bpm. Holter monitor will be done today. Repeat labs ordered. Patient scheduled to followup with electrophysiologist Dr Burch. \par \par Patient admitted  [facility] diploplia r/o TIA, NSVT, normal LVEF, asymptomatic AF on Eliquis, cardizem. Cardiology was consulted for brief asymptomatic NSVT, Baseline AFib controlled ventricular rate.   2-D echo was repeated with normal LVEF 60%.  No change in medications\par Patient had asymptomatic recurrence of A. fib and successful elective DC cardioversion [facility] 8/29/17. EKG remains sinus rhythm first degree AV block LAD, RBBB, age undetermined IMI and AMI\par \par Patient had coughing and wheezing bronchitis and COPD exacerbation August 2017, following with pulmonologist Dr. Contreras.\par \par Pacemaker check  reveals normally functioning pacemaker adequate thresholds and battery.  Pacemaker will be checked every 3 months. \par \par Lexascan Myoview stress September 2017, LVEF 50%, medium size inferior infarct without ischemia, unchanged, no anginal symptoms, baseline sinus rhythm, IVCD, LAD, old ASWMI\par \par 2-D echo [facility] 6/5/18 LVEF 60% mild MR and TR, normal PASP\par \par 2-D echo September 2017, LVEF 60%, mild diastolic dysfunction, mild MR TR, normal PASP\par \par Carotid and abdominal ultrasound September 2017, nonobstructive plaque, proximal aorta 2.7 centimeter\par

## 2019-06-13 NOTE — REVIEW OF SYSTEMS
[Dyspnea on exertion] : dyspnea during exertion [Joint Pain] : joint pain [Joint Stiffness] : joint stiffness [Negative] : Heme/Lymph [Chest  Pressure] : no chest pressure [Chest Pain] : no chest pain [Lower Ext Edema] : no extremity edema [Leg Claudication] : no intermittent leg claudication [Joint Swelling] : no joint swelling [Palpitations] : no palpitations [Muscle Cramps] : no muscle cramps [Limb Weakness (Paresis)] : no limb weakness

## 2019-06-19 ENCOUNTER — APPOINTMENT (OUTPATIENT)
Dept: CARDIOLOGY | Facility: CLINIC | Age: 79
End: 2019-06-19

## 2019-06-25 ENCOUNTER — APPOINTMENT (OUTPATIENT)
Dept: CARDIOLOGY | Facility: CLINIC | Age: 79
End: 2019-06-25
Payer: MEDICARE

## 2019-06-25 PROCEDURE — 93015 CV STRESS TEST SUPVJ I&R: CPT

## 2019-06-25 PROCEDURE — A9502: CPT

## 2019-06-25 PROCEDURE — 78452 HT MUSCLE IMAGE SPECT MULT: CPT

## 2019-06-28 ENCOUNTER — APPOINTMENT (OUTPATIENT)
Dept: CARDIOLOGY | Facility: CLINIC | Age: 79
End: 2019-06-28
Payer: MEDICARE

## 2019-06-28 VITALS
HEART RATE: 78 BPM | DIASTOLIC BLOOD PRESSURE: 60 MMHG | SYSTOLIC BLOOD PRESSURE: 122 MMHG | WEIGHT: 236 LBS | BODY MASS INDEX: 31.97 KG/M2 | HEIGHT: 72 IN

## 2019-06-28 PROCEDURE — 99214 OFFICE O/P EST MOD 30 MIN: CPT

## 2019-06-28 RX ORDER — LEVOFLOXACIN 750 MG/1
TABLET, FILM COATED ORAL
Refills: 0 | Status: DISCONTINUED | COMMUNITY
End: 2019-06-28

## 2019-06-28 NOTE — DISCUSSION/SUMMARY
[FreeTextEntry1] : Hosea is an 78-year-old male with medical history detailed above and active medical issues including: \par  \par - Dyspnea on exertion, medium IMI,  no ischemia  Patient will have noninvasive testing with a exercise Myoview stress test to assess for obstructive CAD, exercise-induced arrhythmia,  blood pressure response. \par \par - Medtronic PPM implant 10/11/18 for SSS/PAF on Eliquis, cardizem . Normal PPM check today, next check 3 months\par \par - Patient has recent URI on antibiotic and increased nebulizer therapy.  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 Patient educated on lifestyle and diet modification with low sodium low fat diet and avoidance of excessive alcohol. Patient is aware to call with any symptoms or concerns. \par  \par Patient will be seen in cardiology follow-up 6 months. Current cardiac medications remain unchanged. Repeat labs will be ordered with PMD.\par \par Hosea will follow up with Dr. Broussard for primary care.\par \par

## 2019-06-28 NOTE — REASON FOR VISIT
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  Patient has increasing NSVT up to 30 seconds on remote monitoring, started on lopressor 25mg twice daily, well tolerated.\par \par Patient has dyspnea with moderate exertion unchanged. Cardiovascular review of symptoms is negative for exertional chest pain,  palpitations, dizziness or syncope. No PND or orthopnea leg edema. No bleeding or black stool.\par \par Patient is walking 10 minutes without exertional chest pain. Patient has been more physically active walking after replacement surgery December 2017\par \par Patient seen by Dr Broussard 9/19/18 found to have A. fib ventricular rate 30s diltiazem reduced from 240mg to 180mg daily.  Currently remains in A. fib with slow ventricular rate.  Cardizem will be changed to 30mg twice per day with heart rate and blood pressure check prior to dose.  Patient will hold Cardizem for heart rate less than 50bpm. Holter monitor will be done today. Repeat labs ordered. Patient scheduled to followup with electrophysiologist Dr Burch. \par \par Patient admitted  [facility] diploplia r/o TIA, NSVT, normal LVEF, asymptomatic AF on Eliquis, cardizem. Cardiology was consulted for brief asymptomatic NSVT, Baseline AFib controlled ventricular rate.   2-D echo was repeated with normal LVEF 60%.  No change in medications\par Patient had asymptomatic recurrence of A. fib and successful elective DC cardioversion [facility] 8/29/17. EKG remains sinus rhythm first degree AV block LAD, RBBB, age undetermined IMI and AMI\par \par Patient had coughing and wheezing bronchitis and COPD exacerbation August 2017, following with pulmonologist Dr. Contreras.\par \par Pacemaker check  reveals normally functioning pacemaker adequate thresholds and battery.  Pacemaker will be checked every 3 months. \par \par Echocardiogram PCP 5/3/19, LVEF 65% normal RVSP moderate MR, mild TR\par \par Carotid Doppler PCP 5/3/19 mild nonobstructive plaque\par \par Lexascan Myoview stress September 2017, LVEF 50%, medium size inferior infarct without ischemia, unchanged, no anginal symptoms, baseline sinus rhythm, IVCD, LAD, old ASWMI\par \par 2-D echo [facility] 6/5/18 LVEF 60% mild MR and TR, normal PASP\par \par 2-D echo September 2017, LVEF 60%, mild diastolic dysfunction, mild MR TR, normal PASP\par \par Carotid and abdominal ultrasound September 2017, nonobstructive plaque, proximal aorta 2.7 centimeter\par

## 2019-06-28 NOTE — PHYSICAL EXAM
[General Appearance - Well Developed] : well developed [Normal Appearance] : normal appearance [Well Groomed] : well groomed [No Deformities] : no deformities [General Appearance - Well Nourished] : well nourished [General Appearance - In No Acute Distress] : no acute distress [Normal Conjunctiva] : the conjunctiva exhibited no abnormalities [Eyelids - No Xanthelasma] : the eyelids demonstrated no xanthelasmas [Normal Oral Mucosa] : normal oral mucosa [No Oral Pallor] : no oral pallor [No Oral Cyanosis] : no oral cyanosis [Normal Jugular Venous V Waves Present] : normal jugular venous V waves present [Normal Jugular Venous A Waves Present] : normal jugular venous A waves present [No Jugular Venous Solano A Waves] : no jugular venous solano A waves [Respiration, Rhythm And Depth] : normal respiratory rhythm and effort [Auscultation Breath Sounds / Voice Sounds] : lungs were clear to auscultation bilaterally [Exaggerated Use Of Accessory Muscles For Inspiration] : no accessory muscle use [Murmurs] : no murmurs present [Abdomen Soft] : soft [Abdomen Tenderness] : non-tender [Abdomen Mass (___ Cm)] : no abdominal mass palpated [Abnormal Walk] : normal gait [Gait - Sufficient For Exercise Testing] : the gait was sufficient for exercise testing [Nail Clubbing] : no clubbing of the fingernails [Cyanosis, Localized] : no localized cyanosis [] : no ischemic changes [Petechial Hemorrhages (___cm)] : no petechial hemorrhages [Oriented To Time, Place, And Person] : oriented to person, place, and time [Mood] : the mood was normal [Affect] : the affect was normal [No Anxiety] : not feeling anxious [FreeTextEntry1] : obese

## 2019-08-06 ENCOUNTER — MEDICATION RENEWAL (OUTPATIENT)
Age: 79
End: 2019-08-06

## 2019-09-02 PROBLEM — I34.0 NON-RHEUMATIC MITRAL REGURGITATION: Status: ACTIVE | Noted: 2017-11-29

## 2019-09-06 ENCOUNTER — APPOINTMENT (OUTPATIENT)
Dept: CARDIOLOGY | Facility: CLINIC | Age: 79
End: 2019-09-06

## 2019-09-09 ENCOUNTER — APPOINTMENT (OUTPATIENT)
Dept: CARDIOLOGY | Facility: CLINIC | Age: 79
End: 2019-09-09
Payer: MEDICARE

## 2019-09-09 PROCEDURE — 93279 PRGRMG DEV EVAL PM/LDLS PM: CPT

## 2019-12-10 ENCOUNTER — APPOINTMENT (OUTPATIENT)
Dept: CARDIOLOGY | Facility: CLINIC | Age: 79
End: 2019-12-10
Payer: MEDICARE

## 2019-12-10 PROCEDURE — 93279 PRGRMG DEV EVAL PM/LDLS PM: CPT

## 2019-12-10 NOTE — PROCEDURE
[Sinus Bradycardia] : sinus bradycardia [Pacemaker] : pacemaker [VVIR] : VVIR [Threshold Testing Performed] : Threshold testing was performed [Lead Imp:  ___ohms] : lead impedance was [unfilled] ohms [Sensing Amplitude ___mv] : sensing amplitude was [unfilled] mv [___V @] : [unfilled] V [___ ms] : [unfilled] ms [None] : none [Sense ___ %] : Sense [unfilled]% [Pace ___ %] : Pace [unfilled]% [de-identified] : Medtronic [de-identified] : Dee MENJIVAR SR MRI [id number] [de-identified] : [id number] [de-identified] : 10/11/2018 [de-identified] : 11.3 years [de-identified] :  [de-identified] : settings\par \par RV\par sensing paced\par amplitude 2.0v (auto)\par duration 0.40ms (auto)\par \par NSVT x 3\par 9-21-19 x 2sec avg v rate 90bpm\par 9-26-19 x 1 sec avg v rate 182bpm\par 11-27-19 x 7 sec avg v rate 181bpm\par \par asymptomatic\par echo 5-3-19 ef 65%\par Nuclear stress test 6-25-19 lexiscan small fixed defect)\par on AC, Diltiazema dn Metoprolol\par \par pt had labs done recently requesting most recent BMP/mag TSH (if not done within month will request updating)\par no changes per PV, f/u remote 3 months, in office 6 months\par confimred pt on remote monitoring and has paired\par note; in past visit DK made rate response more aggressive and pt feels great\par \par Sincerely,\par \par Shawna Marsh PA-C\par patient reviewed and plan advised by supervising physician Dr. Valentino\par \par

## 2020-03-09 ENCOUNTER — APPOINTMENT (OUTPATIENT)
Dept: CARDIOLOGY | Facility: CLINIC | Age: 80
End: 2020-03-09
Payer: MEDICARE

## 2020-03-09 PROCEDURE — 93296 REM INTERROG EVL PM/IDS: CPT

## 2020-03-09 PROCEDURE — 93294 REM INTERROG EVL PM/LDLS PM: CPT

## 2020-03-09 NOTE — PROCEDURE
[Sinus Bradycardia] : sinus bradycardia [Pacemaker] : pacemaker [VVIR] : VVIR [Voltage: ___ volts] : Voltage was [unfilled] volts [Threshold Testing Performed] : Threshold testing was performed [Lead Imp:  ___ohms] : lead impedance was [unfilled] ohms [Sensing Amplitude ___mv] : sensing amplitude was [unfilled] mv [___V @] : [unfilled] V [___ ms] : [unfilled] ms [None] : none [Sense ___ %] : Sense [unfilled]% [Pace ___ %] : Pace [unfilled]% [de-identified] : Medtronic [de-identified] : Dee MENJIVAR SR MRI W1SR01 [de-identified] : [id number] [de-identified] : 10/11/2018 [de-identified] :  [de-identified] : 10.1 years [de-identified] : settings\par \par RV\par sensing 0.90mv\par amplitude 2.25v (auto)\par duration 0.40ms (auto)\par \par NSVT x 9 (longest episode 3 seconds on 2-24-20 with avg v rate 181bpm and on 2-12-20 with avg v rate 199bpm) \par short palp no dizziness or syncope\par \par echo 5-3-19 ef 65%\par Nuclear stress test 6-25-19 lexiscan small fixed defect)\par on AC, Diltiazema dn Metoprolol\par labs 1-29-20 K 4.5\par \par \par pt had labs done today, requesting K,mag TSH (if not done will request to add on, nurse will follow)\par update echo due in May with f/u ov with PV (transferred to  for apt)\par Pt aware with sx to call our office sooner.\par no changes per PV, f/u in office 3 months, remote 6 months\par \par \par Sincerely,\par \par Shawna Marsh PA-C\par patient reviewed and plan advised by supervising physician Dr. Valentino\par \par

## 2020-05-04 ENCOUNTER — APPOINTMENT (OUTPATIENT)
Dept: CARDIOLOGY | Facility: CLINIC | Age: 80
End: 2020-05-04
Payer: MEDICARE

## 2020-05-04 PROCEDURE — 93306 TTE W/DOPPLER COMPLETE: CPT

## 2020-05-11 ENCOUNTER — APPOINTMENT (OUTPATIENT)
Dept: CARDIOLOGY | Facility: CLINIC | Age: 80
End: 2020-05-11
Payer: MEDICARE

## 2020-05-11 PROCEDURE — 99442: CPT

## 2020-06-03 ENCOUNTER — APPOINTMENT (OUTPATIENT)
Dept: CARDIOLOGY | Facility: CLINIC | Age: 80
End: 2020-06-03
Payer: MEDICARE

## 2020-06-03 PROCEDURE — 93280 PM DEVICE PROGR EVAL DUAL: CPT

## 2020-06-03 RX ORDER — DILTIAZEM HYDROCHLORIDE 30 MG/1
30 TABLET ORAL
Qty: 180 | Refills: 1 | Status: DISCONTINUED | COMMUNITY
Start: 2018-09-21 | End: 2020-06-03

## 2020-06-04 NOTE — PROCEDURE
[Pacemaker] : pacemaker [Sinus Bradycardia] : sinus bradycardia [VVIR] : VVIR [Voltage: ___ volts] : Voltage was [unfilled] volts [Threshold Testing Performed] : Threshold testing was performed [Lead Imp:  ___ohms] : lead impedance was [unfilled] ohms [Sensing Amplitude ___mv] : sensing amplitude was [unfilled] mv [___V @] : [unfilled] V [___ ms] : [unfilled] ms [Pace ___ %] : Pace [unfilled]% [None] : none [Sense ___ %] : Sense [unfilled]% [de-identified] : Medtronic [de-identified] : Dee MENJIVAR SR MRI W1SR01 [de-identified] : [id number] [de-identified] : 10/11/2018 [de-identified] :  [de-identified] : 10.8 years [de-identified] : settings\par \par RV\par sensing 0.90mv\par amplitude 2.25v (auto)\par duration 0.40ms (auto)\par \par Several episodes of NSVT.  no dizziness or syncope\par \par echo 5/2020 ef 50%\par Nuclear stress test 6-25-19 lexiscan small fixed defect)\par on AC, and metoprolol.  \par labs 3-20 K 4.8, Mg++: 2.2, TSH: 1.80. \par \par BP today was 98/60mmHg.  \par Pt  unsure of metoprolol dosage.  Either QD or BID.  Will call with proper dosage.  If taking QD, DC amlodipine and take metoprolol BID.  If BID, DC amlodipine and increase to 50mg QHS and 25 QAM. \par Remote check in 1 month to evaluate for any further arrhythmias.

## 2020-07-07 ENCOUNTER — APPOINTMENT (OUTPATIENT)
Dept: CARDIOLOGY | Facility: CLINIC | Age: 80
End: 2020-07-07

## 2020-07-07 RX ORDER — AMLODIPINE BESYLATE 5 MG/1
5 TABLET ORAL DAILY
Refills: 0 | Status: DISCONTINUED | COMMUNITY
End: 2020-07-07

## 2020-07-14 ENCOUNTER — APPOINTMENT (OUTPATIENT)
Dept: CARDIOLOGY | Facility: CLINIC | Age: 80
End: 2020-07-14
Payer: MEDICARE

## 2020-07-14 VITALS
OXYGEN SATURATION: 94 % | WEIGHT: 242 LBS | DIASTOLIC BLOOD PRESSURE: 68 MMHG | TEMPERATURE: 98.4 F | BODY MASS INDEX: 32.78 KG/M2 | HEIGHT: 72 IN | HEART RATE: 89 BPM | SYSTOLIC BLOOD PRESSURE: 118 MMHG

## 2020-07-14 PROCEDURE — 93279 PRGRMG DEV EVAL PM/LDLS PM: CPT

## 2020-07-14 NOTE — PROCEDURE
[Sinus Bradycardia] : sinus bradycardia [Pacemaker] : pacemaker [VVIR] : VVIR [Threshold Testing Performed] : Threshold testing was performed [Lead Imp:  ___ohms] : lead impedance was [unfilled] ohms [Sensing Amplitude ___mv] : sensing amplitude was [unfilled] mv [___V @] : [unfilled] V [___ ms] : [unfilled] ms [None] : none [Sense ___ %] : Sense [unfilled]% [de-identified] : Dee MENJIVAR SR MRI W1SR01 [Pace ___ %] : Pace [unfilled]% [de-identified] : Medtronic [de-identified] : 10/11/2018 [de-identified] : [id number] [de-identified] : 10.7 years [de-identified] :  [de-identified] : settings\par \par RV\par sensing 0.90mv\par amplitude 2.0v (auto)\par duration 0.40ms (auto)\par \par Episodes of NSVT reviewed with Dr. Valentino. Patient asymptomatic. Short duration. Taking Metoprolol 25,g BID. As Per Dr. Valentino no change at this time and continue to monitor. \par \par echo 5/2020 EF 65%\par Nuclear stress test 6-25-19 lexiscan small fixed defect)\par on AC, Diltiazema and Metoprolol\par \par \par Red flag symptoms which would warrant sooner emergent evaluation reviewed with the patient. \par Questions and concerns were addressed and answered. \par \par Sincerely,\par \par Meagan Hansen PA-C\par Patients history, testing and plan reviewed with supervising MD: Dr. Patrick Valentino \par \par

## 2020-08-06 ENCOUNTER — NON-APPOINTMENT (OUTPATIENT)
Age: 80
End: 2020-08-06

## 2020-08-06 ENCOUNTER — APPOINTMENT (OUTPATIENT)
Dept: CARDIOLOGY | Facility: CLINIC | Age: 80
End: 2020-08-06
Payer: MEDICARE

## 2020-08-06 VITALS
OXYGEN SATURATION: 95 % | SYSTOLIC BLOOD PRESSURE: 110 MMHG | WEIGHT: 242 LBS | HEART RATE: 90 BPM | BODY MASS INDEX: 32.78 KG/M2 | HEIGHT: 72 IN | TEMPERATURE: 98.4 F | DIASTOLIC BLOOD PRESSURE: 68 MMHG

## 2020-08-06 DIAGNOSIS — I25.2 OLD MYOCARDIAL INFARCTION: ICD-10-CM

## 2020-08-06 PROCEDURE — 99214 OFFICE O/P EST MOD 30 MIN: CPT

## 2020-08-06 PROCEDURE — 93000 ELECTROCARDIOGRAM COMPLETE: CPT

## 2020-08-06 NOTE — REASON FOR VISIT
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  Patient has increasing NSVT up to 30 seconds on remote monitoring, continue on lopressor 25mg twice daily, well tolerated.\par \par Patient has dyspnea with moderate exertion . Cardiovascular review of symptoms is negative for exertional chest pain,  palpitations, dizziness or syncope. No PND or orthopnea leg edema. No bleeding or black stool.\par \par Patient is walking 10 minutes without exertional chest pain. Patient has been more physically active walking after replacement surgery December 2017\par \par Patient seen by Dr Broussard 9/19/18 found to have A. fib ventricular rate 30s diltiazem reduced from 240mg to 180mg daily.  Currently remains in A. fib with slow ventricular rate.  Cardizem will be changed to 30mg twice per day with heart rate and blood pressure check prior to dose.  Patient will hold Cardizem for heart rate less than 50bpm. Holter monitor will be done today. Repeat labs ordered. Patient scheduled to followup with electrophysiologist Dr Burch. \par \par Patient admitted  [facility] diploplia r/o TIA, NSVT, normal LVEF, asymptomatic AF on Eliquis, cardizem. Cardiology was consulted for brief asymptomatic NSVT, Baseline AFib controlled ventricular rate.   2-D echo was repeated with normal LVEF 60%.  No change in medications\par Patient had asymptomatic recurrence of A. fib and successful elective DC cardioversion [facility] 8/29/17. EKG remains sinus rhythm first degree AV block LAD, RBBB, age undetermined IMI and AMI\par \par Patient had coughing and wheezing bronchitis and COPD exacerbation August 2017, following with pulmonologist Dr. Contreras.\par \[facility] Pacemaker check  reveals normally functioning pacemaker adequate thresholds and battery.  Pacemaker will be checked every 3 months. \par \[facility] Echocardiogram PCP 5/3/19, LVEF 65% normal RVSP moderate MR, mild TR\par \par Carotid Doppler PCP 5/3/19 mild nonobstructive plaque\par \par Lexascan Myoview stress September 2017, LVEF 50%, medium size inferior infarct without ischemia, unchanged, no anginal symptoms, baseline sinus rhythm, IVCD, LAD, old ASWMI\par \par 2-D echo [facility] 6/5/18 LVEF 60% mild MR and TR, normal PASP\par \par 2-D echo September 2017, LVEF 60%, mild diastolic dysfunction, mild MR TR, normal PASP\par \par Carotid and abdominal ultrasound September 2017, nonobstructive plaque, proximal aorta 2.7 centimeter\par  [FreeTextEntry2] : increasing MATHIAS,  PPM,  SSS/PAF on Eliquis, cardizem

## 2020-08-06 NOTE — PHYSICAL EXAM
[General Appearance - Well Developed] : well developed [Normal Appearance] : normal appearance [Well Groomed] : well groomed [General Appearance - Well Nourished] : well nourished [No Deformities] : no deformities [General Appearance - In No Acute Distress] : no acute distress [Normal Conjunctiva] : the conjunctiva exhibited no abnormalities [Eyelids - No Xanthelasma] : the eyelids demonstrated no xanthelasmas [Normal Oral Mucosa] : normal oral mucosa [No Oral Pallor] : no oral pallor [No Oral Cyanosis] : no oral cyanosis [Normal Jugular Venous A Waves Present] : normal jugular venous A waves present [Normal Jugular Venous V Waves Present] : normal jugular venous V waves present [No Jugular Venous Solano A Waves] : no jugular venous solano A waves [Respiration, Rhythm And Depth] : normal respiratory rhythm and effort [Exaggerated Use Of Accessory Muscles For Inspiration] : no accessory muscle use [Auscultation Breath Sounds / Voice Sounds] : lungs were clear to auscultation bilaterally [Murmurs] : no murmurs present [Abdomen Soft] : soft [Abdomen Tenderness] : non-tender [Abdomen Mass (___ Cm)] : no abdominal mass palpated [Gait - Sufficient For Exercise Testing] : the gait was sufficient for exercise testing [Abnormal Walk] : normal gait [Nail Clubbing] : no clubbing of the fingernails [Cyanosis, Localized] : no localized cyanosis [] : no ischemic changes [Petechial Hemorrhages (___cm)] : no petechial hemorrhages [Oriented To Time, Place, And Person] : oriented to person, place, and time [Affect] : the affect was normal [No Anxiety] : not feeling anxious [Mood] : the mood was normal [FreeTextEntry1] : improved walking after hip replacement surgery Dec 2017

## 2020-08-06 NOTE — REVIEW OF SYSTEMS
[Joint Pain] : joint pain [Joint Stiffness] : joint stiffness [Negative] : Heme/Lymph [Chest  Pressure] : no chest pressure [Dyspnea on exertion] : dyspnea during exertion [Chest Pain] : no chest pain [Lower Ext Edema] : no extremity edema [Joint Swelling] : no joint swelling [Palpitations] : no palpitations [Leg Claudication] : no intermittent leg claudication [Muscle Cramps] : no muscle cramps [Limb Weakness (Paresis)] : no limb weakness

## 2020-08-06 NOTE — DISCUSSION/SUMMARY
[Verbal consent obtained from patient] : the patient, [unfilled] [Time Spent: ___ minutes] : I have spent [unfilled] minutes with the patient on the telephone [FreeTextEntry1] : Hosea is an 80-year-old male with medical history detailed above and active medical issues including: \par  \par - Dyspnea on exertion possible anginal equivalent, multiple CAD risk factors.  Patient will have noninvasive testing with a  Lexiscan Myoview stress test to assess for obstructive CAD.\par \par - Medtronic PPM implant 10/11/18 for SSS/PAF on Eliquis, Cardizem . Normal PPM check, next check 3 months\par \par - NSVT up to 30 seconds on remote monitoring in the past, continue lopressor 25mg twice daily, well tolerated.\par \par -  Asymptomatic PAF elective DCCV 8/29/17 and  DCCV 2013 maintaining NSR on Eliquis diltiazem \par \par ¨ COPD stable, followup with pulmonologist Dr Benoit\par \par -  Admission [facility] diplopia r/o TIA, normal head CT, ENT eval Dr Pereira possible sinus surgery\par ¨ HTN, home blood pressures running at goal less than 130/80  \par \par ¨Dyslipidemia on low dose Crestor 5mg every other day, history of statin myalgia, shoulder pain\par \par ¨ Abnormal EKG, 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 Patient educated on lifestyle and diet modification with low sodium low fat diet and avoidance of excessive alcohol. Patient is aware to call with any symptoms or concerns. \par  \par Patient will be seen in cardiology follow-up after noninvasive testing. Current cardiac medications remain unchanged. Repeat labs will be ordered with PMD.\par \par Hosea will follow up with Dr. Broussard for primary care.\par \par

## 2020-08-26 DIAGNOSIS — R79.89 OTHER SPECIFIED ABNORMAL FINDINGS OF BLOOD CHEMISTRY: ICD-10-CM

## 2020-09-02 ENCOUNTER — APPOINTMENT (OUTPATIENT)
Dept: CARDIOLOGY | Facility: CLINIC | Age: 80
End: 2020-09-02
Payer: MEDICARE

## 2020-09-02 PROCEDURE — A9502: CPT

## 2020-09-02 PROCEDURE — 93015 CV STRESS TEST SUPVJ I&R: CPT

## 2020-09-02 PROCEDURE — 78452 HT MUSCLE IMAGE SPECT MULT: CPT

## 2020-09-11 ENCOUNTER — APPOINTMENT (OUTPATIENT)
Dept: CARDIOLOGY | Facility: CLINIC | Age: 80
End: 2020-09-11
Payer: MEDICARE

## 2020-09-11 PROCEDURE — 93294 REM INTERROG EVL PM/LDLS PM: CPT

## 2020-09-11 PROCEDURE — 93296 REM INTERROG EVL PM/IDS: CPT

## 2020-09-14 NOTE — PROCEDURE
[Sinus Bradycardia] : sinus bradycardia [Pacemaker] : pacemaker [Voltage: ___ volts] : Voltage was [unfilled] volts [VVIR] : VVIR [Threshold Testing Performed] : Threshold testing was performed [Lead Imp:  ___ohms] : lead impedance was [unfilled] ohms [Sensing Amplitude ___mv] : sensing amplitude was [unfilled] mv [___V @] : [unfilled] V [Sense ___ %] : Sense [unfilled]% [None] : none [___ ms] : [unfilled] ms [Pace ___ %] : Pace [unfilled]% [de-identified] : Medtronic [de-identified] : [id number] [de-identified] : Dee MENJIVAR SR MRI [id number] [de-identified] :  [de-identified] : 10.9 years [de-identified] : settings\par \par RV\par sensing 0.90mv\par amplitude 2.0v (auto)\par duration 0.40ms (auto)\par \par NSVT x7 (longest x 2 sec with avg v rate 197bpm)\par ep #81, seems longer..26beat avg 194bpm\par \par echo 5-4-20 ef 56%\par Dual 9-2-20 small defect in apical inferior wall that is fixed ef 42%\par on Metoprolol \par 8-11-10 K 4.1, TSH 1.94\par \par pt is asymptomatic and has f/u with PV on 9/18\par this was r/w VB, no changes were advised. \par \par f/u 3 months, sooner if changes in symptoms or status\par Sincerely,\par \par Shawna Marsh PA-C\par patient reviewed and plan advised by supervising physician Dr. Diaz\par \par  [de-identified] : 10/11/2018

## 2020-09-18 ENCOUNTER — APPOINTMENT (OUTPATIENT)
Dept: CARDIOLOGY | Facility: CLINIC | Age: 80
End: 2020-09-18
Payer: MEDICARE

## 2020-09-18 VITALS
HEIGHT: 72 IN | SYSTOLIC BLOOD PRESSURE: 114 MMHG | OXYGEN SATURATION: 96 % | HEART RATE: 91 BPM | BODY MASS INDEX: 31.42 KG/M2 | DIASTOLIC BLOOD PRESSURE: 64 MMHG | TEMPERATURE: 97.5 F | WEIGHT: 232 LBS

## 2020-09-18 PROCEDURE — 99214 OFFICE O/P EST MOD 30 MIN: CPT

## 2020-09-18 NOTE — REASON FOR VISIT
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  Patient has brief NSVT on remote monitoring, continue on lopressor 25mg twice daily, well tolerated.\par \par Patient has dyspnea with moderate exertion unchanged. Cardiovascular review of symptoms is negative for exertional chest pain,  palpitations, dizziness or syncope. No PND or orthopnea leg edema. No bleeding or black stool.\par \par Patient is walking 10 minutes without exertional chest pain. Patient has been more physically active walking after replacement surgery December 2017\par \par Patient seen by Dr Broussard 9/19/18 found to have A. fib ventricular rate 30s diltiazem reduced from 240mg to 180mg daily.  Currently remains in A. fib with slow ventricular rate.  Cardizem will be changed to 30mg twice per day with heart rate and blood pressure check prior to dose.  Patient will hold Cardizem for heart rate less than 50bpm. Holter monitor will be done today. Repeat labs ordered. Patient scheduled to followup with electrophysiologist Dr Burch. \par \par Patient admitted  [facility] diploplia r/o TIA, NSVT, normal LVEF, asymptomatic AF on Eliquis, cardizem. Cardiology was consulted for brief asymptomatic NSVT, Baseline AFib controlled ventricular rate.   2-D echo was repeated with normal LVEF 60%.  No change in medications\par Patient had asymptomatic recurrence of A. fib and successful elective DC cardioversion [facility] 8/29/17. EKG remains sinus rhythm first degree AV block LAD, RBBB, age undetermined IMI and AMI\par \par Patient had coughing and wheezing bronchitis and COPD exacerbation August 2017, following with pulmonologist Dr. Contreras.\par \[facility] Pacemaker check  reveals normally functioning pacemaker adequate thresholds and battery.  Pacemaker will be checked every 3 months.\par \[facility] Lexiscan Myoview stress test September 2020, LVEF 42%, likely reduced due to gating artifact, small apical inferior infarct no ischemia, unchanged from prior study, hypotensive response to Lexiscan given Aminophylline and normal saline bolus. \par \par Echocardiogram May 2020, LVEF 56%, moderate diastolic dysfunction, mild to moderate MR, normal RVSP, LA 5.0\par \par Echocardiogram PCP 5/3/19, LVEF 65% normal RVSP moderate MR, mild TR\par \par Carotid Doppler PCP 5/3/19 mild nonobstructive plaque\par \par Lexascan Myoview stress September 2017, LVEF 50%, medium size inferior infarct without ischemia, unchanged, no anginal symptoms, baseline sinus rhythm, IVCD, LAD, old ASWMI\par \par 2-D echo [facility] 6/5/18 LVEF 60% mild MR and TR, normal PASP\par \par 2-D echo September 2017, LVEF 60%, mild diastolic dysfunction, mild MR TR, normal PASP\par \par Carotid and abdominal ultrasound September 2017, nonobstructive plaque, proximal aorta 2.7 centimeter\par

## 2020-09-18 NOTE — DISCUSSION/SUMMARY
[Verbal consent obtained from patient] : the patient, [unfilled] [Time Spent: ___ minutes] : I have spent [unfilled] minutes with the patient on the telephone [FreeTextEntry1] : Hosea is an 80-year-old male with medical history detailed above and active medical issues including: \par  \par - Dyspnea on exertion unchanged Myoview stress test small infarct with out ischemia,  September 2020.  Normal LVEF by echo May 2020\par \par - Medtronic PPM implant 10/11/18 for SSS/PAF on Eliquis, Cardizem . Normal PPM check, next check 3 months\par \par -Brief NSVT on remote monitoring in the past, continue lopressor 25mg twice daily, well tolerated.\par \par -  Asymptomatic PAF elective DCCV 8/29/17 and  DCCV 2013 maintaining NSR on Eliquis diltiazem \par \par ¨ COPD stable, followup with pulmonologist Dr Benoit\par \par -  Admission [facility] diplopia r/o TIA, normal head CT, ENT eval Dr Pereira possible sinus surgery\par ¨ HTN, home blood pressures running at goal less than 130/80  \par \par ¨Dyslipidemia on low dose Crestor 5mg every other day, history of statin myalgia, shoulder pain\par \par ¨ Abnormal EKG, 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 Patient educated on lifestyle and diet modification with low sodium low fat diet and avoidance of excessive alcohol. Patient is aware to call with any symptoms or concerns. \par  \par Patient will be seen in cardiology follow-up 6 months with echocardiogram for LVEF. Current cardiac medications remain unchanged. Repeat labs will be ordered with PMD.\par \par Hosea will follow up with Dr. Broussard for primary care.\par \par

## 2020-12-15 ENCOUNTER — APPOINTMENT (OUTPATIENT)
Dept: CARDIOLOGY | Facility: CLINIC | Age: 80
End: 2020-12-15
Payer: MEDICARE

## 2020-12-15 PROCEDURE — 99072 ADDL SUPL MATRL&STAF TM PHE: CPT

## 2020-12-15 PROCEDURE — 93279 PRGRMG DEV EVAL PM/LDLS PM: CPT

## 2020-12-15 NOTE — PROCEDURE
[Sinus Bradycardia] : sinus bradycardia [Pacemaker] : pacemaker [VVIR] : VVIR [Voltage: ___ volts] : Voltage was [unfilled] volts [Threshold Testing Performed] : Threshold testing was performed [Lead Imp:  ___ohms] : lead impedance was [unfilled] ohms [Sensing Amplitude ___mv] : sensing amplitude was [unfilled] mv [___V @] : [unfilled] V [___ ms] : [unfilled] ms [None] : none [Pace ___ %] : Pace [unfilled]% [de-identified] : Medtronic [de-identified] : Dee MENJIVAR SR MRI [id number] [de-identified] : [id number] [de-identified] : 10/11/2018 [de-identified] :  [de-identified] : 10.5 years [de-identified] : settings\par \par RV\par sensing 0.90mv\par amplitude 2.0v (auto)\par duration 0.40ms (auto)\par \par NSVT x3 since last remote interrogation.   Single chamber device.  Difficult to tell if AF or NSVT.  Longest 2 seconds.  2 episodes irregular.  Increase Lopressor to 50mg BID.  Discussed symptoms of hypotension.  He will call office with any side effects.  \par \par echo 5-4-20 ef 56%\par Dual 9-2-20 small defect in apical inferior wall that is fixed ef 42%\par on Metoprolol \par 8-11-10 K 4.1, TSH 1.94\par \par 3 month remote device check.

## 2021-01-11 ENCOUNTER — INPATIENT (INPATIENT)
Facility: HOSPITAL | Age: 81
LOS: 0 days | Discharge: HOME | End: 2021-01-12
Payer: MEDICARE

## 2021-01-11 ENCOUNTER — OUTPATIENT (OUTPATIENT)
Dept: OUTPATIENT SERVICES | Facility: HOSPITAL | Age: 81
LOS: 1 days | End: 2021-01-11

## 2021-01-11 ENCOUNTER — APPOINTMENT (OUTPATIENT)
Dept: CARDIOLOGY | Facility: CLINIC | Age: 81
End: 2021-01-11

## 2021-01-11 PROCEDURE — 93010 ELECTROCARDIOGRAM REPORT: CPT

## 2021-01-11 PROCEDURE — 99285 EMERGENCY DEPT VISIT HI MDM: CPT

## 2021-01-11 PROCEDURE — 71045 X-RAY EXAM CHEST 1 VIEW: CPT | Mod: 26

## 2021-01-12 ENCOUNTER — OUTPATIENT (OUTPATIENT)
Dept: OUTPATIENT SERVICES | Facility: HOSPITAL | Age: 81
LOS: 1 days | End: 2021-01-12

## 2021-01-12 PROCEDURE — 99223 1ST HOSP IP/OBS HIGH 75: CPT

## 2021-01-15 ENCOUNTER — APPOINTMENT (OUTPATIENT)
Dept: CARDIOLOGY | Facility: CLINIC | Age: 81
End: 2021-01-15
Payer: MEDICARE

## 2021-01-15 VITALS
SYSTOLIC BLOOD PRESSURE: 116 MMHG | HEART RATE: 78 BPM | TEMPERATURE: 97.7 F | BODY MASS INDEX: 28.85 KG/M2 | HEIGHT: 72 IN | WEIGHT: 213 LBS | OXYGEN SATURATION: 98 % | DIASTOLIC BLOOD PRESSURE: 64 MMHG

## 2021-01-15 DIAGNOSIS — I49.3 VENTRICULAR PREMATURE DEPOLARIZATION: ICD-10-CM

## 2021-01-15 DIAGNOSIS — G43.009 MIGRAINE W/OUT AURA, NOT INTRACTABLE, W/OUT STATUS MIGRAINOSUS: ICD-10-CM

## 2021-01-15 PROCEDURE — 99214 OFFICE O/P EST MOD 30 MIN: CPT

## 2021-01-15 PROCEDURE — 99072 ADDL SUPL MATRL&STAF TM PHE: CPT

## 2021-01-15 RX ORDER — ALBUTEROL SULFATE 2.5 MG/3ML
(2.5 MG/3ML) SOLUTION RESPIRATORY (INHALATION)
Qty: 810 | Refills: 0 | Status: ACTIVE | COMMUNITY
Start: 2019-12-31

## 2021-01-15 RX ORDER — METOPROLOL SUCCINATE 25 MG/1
25 TABLET, EXTENDED RELEASE ORAL
Qty: 180 | Refills: 3 | Status: DISCONTINUED | COMMUNITY
Start: 2019-06-07 | End: 2021-01-15

## 2021-01-15 NOTE — REASON FOR VISIT
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, left hip JANE Dr Uriostegui [facility] 12/8/17, nonobstructive cath 2007, small inferior and apical MI Myoview stress 5/15, RBBB, asymptomatic rapid atrial fibrillation CHADS score is 6, successful DC cardioversion in May 2013, Medtronic PPM implant 10/11/18 for SSS/PAF on Eliquis, cardizem, NSVT normal LVEF .\par \par Patient had neck and jaw pain with swelling lasting 30 minutes sent to [facility] ER 1/11/2021, normal troponin, nondiagnostic EKG with V-pacing, no further pain discharged for outpatient cardiology follow-up. \par \par Patient has dyspnea with moderate exertion. Cardiovascular review of symptoms is negative for exertional chest pain,  palpitations, dizziness or syncope. No PND or orthopnea leg edema. No bleeding or black stool.\par \par Patient is walking 10 minutes without exertional chest pain. Patient has been more physically active walking after replacement surgery December 2017\par \par Patient on nebulizer therapy for COPD.  Patient has increasing NSVT up to 30 seconds on remote monitoring, continue on lopressor 25mg twice daily, well tolerated.\par \par Patient seen by Dr Broussard 9/19/18 found to have A. fib ventricular rate 30s diltiazem reduced from 240mg to 180mg daily.  Currently remains in A. fib with slow ventricular rate.  Cardizem will be changed to 30mg twice per day with heart rate and blood pressure check prior to dose.  Patient will hold Cardizem for heart rate less than 50bpm. Holter monitor will be done today. Repeat labs ordered. Patient scheduled to followup with electrophysiologist Dr Burch. \par \par Patient admitted  [facility] diploplia r/o TIA, NSVT, normal LVEF, asymptomatic AF on Eliquis, cardizem. Cardiology was consulted for brief asymptomatic NSVT, Baseline AFib controlled ventricular rate.   2-D echo was repeated with normal LVEF 60%.  No change in medications\par \par Patient had asymptomatic recurrence of A. fib and successful elective DC cardioversion [facility] 8/29/17. EKG remains sinus rhythm first degree AV block LAD, RBBB, age undetermined IMI and AMI\par \par Patient had coughing and wheezing bronchitis and COPD exacerbation August 2017, following with pulmonologist Dr. Contreras.\par \par Pacemaker check  reveals normally functioning pacemaker adequate thresholds and battery.  Pacemaker will be checked every 3 months. \par \par Lexiscan Myoview stress test September 2020, small fixed inferior apical defect no ischemia, LVEF 42%, nondiagnostic EKG with baseline V pacing, hypotension with Lexiscan resolved with Aminophyllin\par \par Echocardiogram May 2020, LVEF 50%, diastolic dysfunction, mild to moderate MR, mild TR, normal RVSP\par \par Echocardiogram PCP 5/3/19, LVEF 65% normal RVSP moderate MR, mild TR\par \par Carotid Doppler PCP 5/3/19 mild nonobstructive plaque\par \par Lexascan Myoview stress September 2017, LVEF 50%, medium size inferior infarct without ischemia, unchanged, no anginal symptoms, baseline sinus rhythm, IVCD, LAD, old ASWMI\par \par 2-D echo [facility] 6/5/18 LVEF 60% mild MR and TR, normal PASP\par \par 2-D echo September 2017, LVEF 60%, mild diastolic dysfunction, mild MR TR, normal PASP\par \par Carotid and abdominal ultrasound September 2017, nonobstructive plaque, proximal aorta 2.7 centimeter\par

## 2021-01-15 NOTE — REVIEW OF SYSTEMS
[Dyspnea on exertion] : dyspnea during exertion [Joint Pain] : joint pain [Joint Stiffness] : joint stiffness [Negative] : Endocrine [Chest  Pressure] : no chest pressure [Chest Pain] : no chest pain [Lower Ext Edema] : no extremity edema [Leg Claudication] : no intermittent leg claudication [Palpitations] : no palpitations [Joint Swelling] : no joint swelling [Muscle Cramps] : no muscle cramps [Limb Weakness (Paresis)] : no limb weakness

## 2021-01-15 NOTE — DISCUSSION/SUMMARY
[FreeTextEntry1] : Hosea is an 80-year-old male with medical history detailed above and active medical issues including: \par \par - Atypical chest pain [facility] ER evaluation 11/1/2021, normal troponin, nondiagnostic EKG V pacing, Lexiscan Myoview stress test September 2020 without ischemia, nonobstructive catheterization 2007.\par  \par - Medtronic PPM implant 10/11/18 for SSS/PAF on Eliquis, Cardizem . Normal PPM check, next check 3 months\par \par - NSVT up to 30 seconds on remote monitoring in the past, continue lopressor 25mg twice daily, well tolerated.\par \par -  Asymptomatic PAF elective DCCV 8/29/17 and  DCCV 2013 maintaining NSR on Eliquis diltiazem \par \par ¨ COPD stable, followup with pulmonologist Dr Benoit\par \par -  Admission [facility] diplopia r/o TIA, normal head CT, ENT eval Dr Pereira possible sinus surgery\par \par ¨ HTN BP at guideline goal on Lopressor, lisinopril and Lasix\par \par ¨Dyslipidemia on low dose Crestor 5mg every other day, history of statin myalgia, shoulder pain\par \par ¨ Hx TIA no residual defect\par \par - post op left hip JANE Dr Uriostegui 12/8/17 [facility]\par \par ¨ migraine stable \par \par ¨ 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 Patient educated on lifestyle and diet modification with low sodium low fat diet and avoidance of excessive alcohol. Patient is aware to call with any symptoms or concerns. \par  \par Patient will be seen in cardiology follow-up May 2021 with 2-D echocardiogram to assess LV systolic function, structural heart disease. Carotid and abdominal ultrasound to assess for obstructive PAD.  \par \par Current cardiac medications remain unchanged. Repeat labs will be ordered with PMD.\par \par Hosea will follow up with Dr. Broussard for primary care.\par \par

## 2021-03-05 ENCOUNTER — NON-APPOINTMENT (OUTPATIENT)
Age: 81
End: 2021-03-05

## 2021-03-08 ENCOUNTER — APPOINTMENT (OUTPATIENT)
Dept: CARDIOLOGY | Facility: CLINIC | Age: 81
End: 2021-03-08
Payer: MEDICARE

## 2021-03-08 VITALS
TEMPERATURE: 96.8 F | DIASTOLIC BLOOD PRESSURE: 76 MMHG | HEIGHT: 72 IN | HEART RATE: 86 BPM | BODY MASS INDEX: 28.71 KG/M2 | WEIGHT: 212 LBS | SYSTOLIC BLOOD PRESSURE: 134 MMHG | OXYGEN SATURATION: 97 %

## 2021-03-08 DIAGNOSIS — R15.9 UNSPECIFIED URINARY INCONTINENCE: ICD-10-CM

## 2021-03-08 DIAGNOSIS — R32 UNSPECIFIED URINARY INCONTINENCE: ICD-10-CM

## 2021-03-08 PROCEDURE — 99072 ADDL SUPL MATRL&STAF TM PHE: CPT

## 2021-03-08 PROCEDURE — 99215 OFFICE O/P EST HI 40 MIN: CPT

## 2021-03-08 RX ORDER — PREDNISONE 10 MG
TABLET ORAL
Refills: 0 | Status: DISCONTINUED | COMMUNITY
End: 2021-03-08

## 2021-03-08 RX ORDER — UBIDECARENONE/VIT E ACET 100MG-5
50 MCG CAPSULE ORAL
Refills: 0 | Status: DISCONTINUED | COMMUNITY
End: 2021-03-08

## 2021-03-08 RX ORDER — PREDNISONE 50 MG/1
50 TABLET ORAL
Qty: 5 | Refills: 0 | Status: DISCONTINUED | COMMUNITY
Start: 2020-07-20 | End: 2021-03-08

## 2021-03-08 NOTE — PHYSICAL EXAM
[General Appearance - Well Developed] : well developed [Normal Appearance] : normal appearance [Well Groomed] : well groomed [General Appearance - Well Nourished] : well nourished [No Deformities] : no deformities [General Appearance - In No Acute Distress] : no acute distress [Normal Conjunctiva] : the conjunctiva exhibited no abnormalities [Eyelids - No Xanthelasma] : the eyelids demonstrated no xanthelasmas [No Oral Pallor] : no oral pallor [No Oral Cyanosis] : no oral cyanosis [Normal Jugular Venous A Waves Present] : normal jugular venous A waves present [Normal Jugular Venous V Waves Present] : normal jugular venous V waves present [No Jugular Venous Solano A Waves] : no jugular venous solano A waves [Respiration, Rhythm And Depth] : normal respiratory rhythm and effort [Exaggerated Use Of Accessory Muscles For Inspiration] : no accessory muscle use [Auscultation Breath Sounds / Voice Sounds] : lungs were clear to auscultation bilaterally [Murmurs] : no murmurs present [Abdomen Soft] : soft [Abdomen Tenderness] : non-tender [Abdomen Mass (___ Cm)] : no abdominal mass palpated [Abnormal Walk] : normal gait [Gait - Sufficient For Exercise Testing] : the gait was sufficient for exercise testing [Nail Clubbing] : no clubbing of the fingernails [Cyanosis, Localized] : no localized cyanosis [Petechial Hemorrhages (___cm)] : no petechial hemorrhages [] : no ischemic changes [Skin Color & Pigmentation] : normal skin color and pigmentation [Oriented To Time, Place, And Person] : oriented to person, place, and time [Affect] : the affect was normal [Mood] : the mood was normal [No Anxiety] : not feeling anxious [FreeTextEntry1] : improved walking after hip replacement surgery Dec 2017

## 2021-03-08 NOTE — REASON FOR VISIT
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 He feels like it started with fluttering in his chest. His wife noticed him slumping in his chair with eyes wide open. It lasted about 30 seconds and then he was back to normal. No clear inciting event. No clear alleviating factors. There has been no falls. He states a cold wash cloth helped with flushing thereafter. They did not call 911. Pt states he's had a few episodes like this before in the past few months but this was the first that was witnessed by his wife. Denies headache, blurry vision, slurred speech, or focal weakness. \par \par Patient had increasing NSVT up to 30 seconds on remote monitoring, toprol was increased to 50mg BID which seems to have reduced episodes. \par \par I interrogated his pacemaker today which shows no arrhythmias associated with above episodes. He had brief 1 sec NSVT on 2/6/21 for which he was asymptomatic. \par \par He has been watching his BP at home and reports stable readings similar to today. \par \par Patient had neck and jaw pain with swelling lasting 30 minutes sent to [facility] ER 1/11/2021, normal troponin, nondiagnostic EKG with V-pacing, no further pain discharged for outpatient cardiology follow-up. \par \par Patient is walking 10 minutes without exertional chest pain. Patient has been more physically active walking after replacement surgery December 2017. Patient has dyspnea with moderate exertion. Cardiovascular review of symptoms is negative for exertional chest pain. No PND or orthopnea leg edema. No bleeding or black stool.\par \par Recent testing has been reviewed:\par Labs 1/12/21 hgb 14.7, creat 0.7, k 3.6, LDL 86, A1c 5.6\par \par Lexiscan Myoview stress test September 2020, small fixed inferior apical defect no ischemia, LVEF 42%, nondiagnostic EKG with baseline V pacing, hypotension with Lexiscan resolved with Aminophyllin\par \par Echocardiogram May 2020, LVEF 50%, diastolic dysfunction, mild to moderate MR, mild TR, normal RVSP\par \par Carotid Doppler PCP 5/3/19 mild nonobstructive plaque\par

## 2021-03-08 NOTE — DISCUSSION/SUMMARY
[FreeTextEntry1] : Hosea is an 80-year-old male with medical history detailed above and active medical issues including:\par \par - Near syncopal episodes, possible seizer like activity? Associated facial flushing, loss of bladder/bowels, and shaking. Lasting about 30 seconds. Pt asymptomatic today, no FND noted, neurologically intact on exam. No associated arrhythmias on his pacemaker interrogation. His carotid doppler 2 yrs ago showed mild nonobx disease. Recommend neurology consult ASAP, will call to facilitate.  I asked pt and wife that if this recurs prior to consultation, call 911 for emergent evaluation. They verbalize understanding. \par \par - Atypical chest pain [facility] ER evaluation 11/1/2021, normal troponin, nondiagnostic EKG V pacing, Lexiscan Myoview stress test September 2020 without ischemia, nonobstructive catheterization 2007. No further chest pain is noted.\par  \par - Medtronic PPM implant 10/11/18 for SSS/PAF on Eliquis, Cardizem . Normal PPM check today. \par \par - NSVT up to 30 seconds on remote monitoring in the past. Improved since incr in toprol to 50mg BID. He had one brief 1 sec episode last month which was asx. pLVEF. Continue current dose of BB. \par \par -  Asymptomatic PAF elective DCCV 8/29/17 and  DCCV 2013 maintaining NSR on Eliquis diltiazem. Reviewed bleeding precautions, stable labs noted in Jan. 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 Farhan Diaz \par

## 2021-03-08 NOTE — REVIEW OF SYSTEMS
[Dyspnea on exertion] : dyspnea during exertion [Joint Pain] : joint pain [Joint Stiffness] : joint stiffness [see HPI] : see HPI [Negative] : Heme/Lymph [Chest  Pressure] : no chest pressure [Chest Pain] : no chest pain [Lower Ext Edema] : no extremity edema [Leg Claudication] : no intermittent leg claudication [Palpitations] : no palpitations [Joint Swelling] : no joint swelling [Muscle Cramps] : no muscle cramps [Limb Weakness (Paresis)] : no limb weakness

## 2021-03-08 NOTE — ADDENDUM
[FreeTextEntry1] : Please note the patient was reviewed with the NP.\par I was physically present during the service of the patient\dontrell I was directly involved in the management plan and recommendations of care provided to the patient. \par I personally reviewed the history and physical exam and plan as documented by the NP above.\par \par Farhan Diaz DO, FACC, RPVI\par Cardiologist\par 03/8/2021

## 2021-03-19 ENCOUNTER — APPOINTMENT (OUTPATIENT)
Dept: CARDIOLOGY | Facility: CLINIC | Age: 81
End: 2021-03-19

## 2021-03-25 ENCOUNTER — NON-APPOINTMENT (OUTPATIENT)
Age: 81
End: 2021-03-25

## 2021-03-25 ENCOUNTER — APPOINTMENT (OUTPATIENT)
Dept: CARDIOLOGY | Facility: CLINIC | Age: 81
End: 2021-03-25
Payer: MEDICARE

## 2021-03-25 VITALS
DIASTOLIC BLOOD PRESSURE: 60 MMHG | OXYGEN SATURATION: 95 % | WEIGHT: 207 LBS | HEART RATE: 90 BPM | HEIGHT: 72 IN | TEMPERATURE: 97.6 F | BODY MASS INDEX: 28.04 KG/M2 | SYSTOLIC BLOOD PRESSURE: 122 MMHG

## 2021-03-25 DIAGNOSIS — K21.9 GASTRO-ESOPHAGEAL REFLUX DISEASE W/OUT ESOPHAGITIS: ICD-10-CM

## 2021-03-25 PROCEDURE — 93296 REM INTERROG EVL PM/IDS: CPT

## 2021-03-25 PROCEDURE — 99072 ADDL SUPL MATRL&STAF TM PHE: CPT

## 2021-03-25 PROCEDURE — 93294 REM INTERROG EVL PM/LDLS PM: CPT

## 2021-03-25 PROCEDURE — 99214 OFFICE O/P EST MOD 30 MIN: CPT

## 2021-03-25 RX ORDER — POTASSIUM CHLORIDE 750 MG/1
10 CAPSULE, EXTENDED RELEASE ORAL
Qty: 90 | Refills: 0 | Status: DISCONTINUED | COMMUNITY
Start: 2017-11-17 | End: 2021-03-25

## 2021-03-25 NOTE — DISCUSSION/SUMMARY
[FreeTextEntry1] : Hosea is an 80-year-old male with medical history detailed above and active medical issues including: \par \par - Chest pain [facility] ER evaluation 11/1/2021, normal troponin, nondiagnostic EKG V pacing. Risks and options of cardiac catheterization have been discussed, including the risk of bleeding stroke heart attack.  Patient wishes to proceed and will be scheduled for catheterization within one week. Allergy history to aspirin with anaphylaxis.  Persistent chest pain patient will call 911 and go to the emergency room.\par \par - Multiple unresponsive episodes with facial flushing arm weakness and urinary bladder incontinence.  Patient following up with neurologist Dr. Champion, EEG recently completed, neurology recommending cardiac catheterization.\par \par - Medtronic PPM implant 10/11/18 for SSS/PAF on Eliquis, Cardizem . Normal PPM check, next check 3 months\par \par - NSVT up to 30 seconds on remote monitoring in the past, continue lopressor 25mg twice daily, well tolerated.\par \par -  Asymptomatic PAF elective DCCV 8/29/17 and  DCCV 2013 maintaining NSR on Eliquis diltiazem \par \par ¨ COPD stable, followup with pulmonologist Dr Benoit\par \par -  Admission [facility] diplopia r/o TIA, normal head CT, ENT eval Dr Pereira possible sinus surgery\par \par ¨ HTN BP at guideline goal on Lopressor, lisinopril and Lasix\par \par ¨ Dyslipidemia on low dose Crestor 5mg every other day, history of statin myalgia, shoulder pain\par \par ¨ Hx TIA no residual defect\par \par - post op left hip JANE Dr Uriostegui 12/8/17 [facility]\par \par ¨ migraine stable \par \par ¨ 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 Patient educated on lifestyle and diet modification with low sodium low fat diet and avoidance of excessive alcohol. Patient is aware to call with any symptoms or concerns. \par  \par Patient will be seen in cardiology follow-up after cardiac catherization. \par \par Current cardiac medications remain unchanged. Repeat labs will be ordered with PMD.\par \par Hosea will follow up with Dr. Broussard for primary care.\par \par \par

## 2021-03-25 NOTE — REASON FOR VISIT
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, [place] Uriostegui [facility] [place], nonobstructive cath 2007, small inferior and apical MI Myoview stress 5/15, RBBB, asymptomatic rapid atrial fibrillation CHADS score is 6, successful DC cardioversion in May 2013, Medtronic PPM implant 10/11/18 for SSS/PAF on Eliquis, cardizem, NSVT normal LVEF .\par \par Multiple unresponsive episodes with facial flushing arm weakness and urinary bladder incontinence.  Patient following up with neurologist Dr. Champion, EEG recently completed, neurology recommending cardiac catheterization.\par \par Patient had neck and jaw pain with swelling lasting 30 minutes [place] [facility] [place], normal troponin, nondiagnostic EKG with V-pacing, no further pain discharged for outpatient cardiology follow-up. \par \par Patient has dyspnea with moderate exertion. Cardiovascular review of symptoms is negative for exertional chest pain,  palpitations, dizziness or syncope. No PND or orthopnea leg edema. No bleeding or black stool.\par \par Patient is walking 10 minutes without exertional chest pain. Patient has been more physically active walking after replacement surgery December 2017\par \par Patient on nebulizer therapy for COPD.  Patient has increasing NSVT up to 30 seconds on remote monitoring, continue on lopressor 25mg twice daily, well tolerated.\par \par Patient seen by Dr Broussard 9/19/18 found to have A. fib ventricular rate 30s diltiazem reduced from 240mg to 180mg daily.  Currently remains in A. fib with slow ventricular rate.  Cardizem will be changed to 30mg twice per day with heart rate and blood pressure check prior to dose.  Patient will hold Cardizem for heart rate less than 50bpm. Holter monitor will be done today. Repeat labs ordered. Patient scheduled to followup with electrophysiologist Dr Burch. \par \par Patient admitted  [facility] diploplia r/o TIA, NSVT, normal LVEF, asymptomatic AF on Eliquis, cardizem. Cardiology was consulted for brief asymptomatic NSVT, Baseline AFib controlled ventricular rate.   2-D echo was repeated with normal LVEF 60%.  No change in medications\par \par Patient had asymptomatic recurrence of A. fib and successful elective DC cardioversion [facility] 8/29/17. EKG remains sinus rhythm first degree AV block LAD, RBBB, age undetermined IMI and AMI\par \par Patient had coughing and wheezing bronchitis and COPD exacerbation August 2017, following with pulmonologist Dr. Contreras.\par \par Pacemaker check  reveals normally functioning pacemaker adequate thresholds and battery.  Pacemaker will be checked every 3 months. \par \par Lexiscan Myoview stress test September 2020, small fixed inferior apical defect no ischemia, LVEF 42%, nondiagnostic EKG with baseline V pacing, hypotension with Lexiscan resolved with Aminophyllin\par \par Echocardiogram May 2020, LVEF 50%, diastolic dysfunction, mild to moderate MR, mild TR, normal RVSP\par \par Echocardiogram PCP 5/3/19, LVEF 65% normal RVSP moderate MR, mild TR\par \par Carotid Doppler PCP 5/3/19 mild nonobstructive plaque\par \par Lexascan Myoview stress September 2017, LVEF 50%, medium size inferior infarct without ischemia, unchanged, no anginal symptoms, baseline sinus rhythm, IVCD, LAD, 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

## 2021-03-25 NOTE — PROCEDURE
[Sinus Bradycardia] : sinus bradycardia [Pacemaker] : pacemaker [VVIR] : VVIR [Voltage: ___ volts] : Voltage was [unfilled] volts [Threshold Testing Performed] : Threshold testing was performed [Lead Imp:  ___ohms] : lead impedance was [unfilled] ohms [Sensing Amplitude ___mv] : sensing amplitude was [unfilled] mv [___V @] : [unfilled] V [___ ms] : [unfilled] ms [None] : none [Pace ___ %] : Pace [unfilled]% [de-identified] : Medtronic [de-identified] : Dee MENJIVAR SR MRI [id number] [de-identified] : [id number] [de-identified] : 10/11/2018 [de-identified] :  [de-identified] : 10.1 years [de-identified] : settings\par \par RV\par sensing 0.90mv\par amplitude 2.0v (auto)\par duration 0.40ms (auto)\par \par no episodes since interroation in office march 8th\par \par \par 3 month remote device check. 6/28\par \par sent to BS for review

## 2021-04-11 ENCOUNTER — APPOINTMENT (OUTPATIENT)
Dept: DISASTER EMERGENCY | Facility: CLINIC | Age: 81
End: 2021-04-11

## 2021-04-12 LAB — SARS-COV-2 N GENE NPH QL NAA+PROBE: NOT DETECTED

## 2021-04-14 ENCOUNTER — OUTPATIENT (OUTPATIENT)
Dept: INPATIENT UNIT | Facility: HOSPITAL | Age: 81
LOS: 1 days | End: 2021-04-14
Payer: MEDICARE

## 2021-04-14 PROCEDURE — 93010 ELECTROCARDIOGRAM REPORT: CPT

## 2021-04-14 PROCEDURE — 93458 L HRT ARTERY/VENTRICLE ANGIO: CPT | Mod: 26

## 2021-04-14 PROCEDURE — 99214 OFFICE O/P EST MOD 30 MIN: CPT

## 2021-04-14 PROCEDURE — 99204 OFFICE O/P NEW MOD 45 MIN: CPT

## 2021-04-20 ENCOUNTER — APPOINTMENT (OUTPATIENT)
Dept: CARDIOLOGY | Facility: CLINIC | Age: 81
End: 2021-04-20
Payer: MEDICARE

## 2021-04-20 VITALS
SYSTOLIC BLOOD PRESSURE: 130 MMHG | TEMPERATURE: 97 F | OXYGEN SATURATION: 98 % | DIASTOLIC BLOOD PRESSURE: 74 MMHG | HEART RATE: 64 BPM | HEIGHT: 72 IN | BODY MASS INDEX: 29.12 KG/M2 | WEIGHT: 215 LBS

## 2021-04-20 PROCEDURE — 99213 OFFICE O/P EST LOW 20 MIN: CPT

## 2021-04-20 PROCEDURE — 99072 ADDL SUPL MATRL&STAF TM PHE: CPT

## 2021-04-20 NOTE — ASSESSMENT
[FreeTextEntry1] : CAD: Nonobstructive by invasive coronary angiography.  There is been no recurrence of chest discomfort or shortness of breath.\par \par Atrial fibrillation: The patient is tolerating anticoagulant therapy without difficulty.

## 2021-05-12 ENCOUNTER — APPOINTMENT (OUTPATIENT)
Dept: CARDIOLOGY | Facility: CLINIC | Age: 81
End: 2021-05-12
Payer: MEDICARE

## 2021-05-12 PROCEDURE — 93306 TTE W/DOPPLER COMPLETE: CPT

## 2021-05-12 PROCEDURE — 99072 ADDL SUPL MATRL&STAF TM PHE: CPT

## 2021-05-12 PROCEDURE — 93880 EXTRACRANIAL BILAT STUDY: CPT

## 2021-05-12 PROCEDURE — 93979 VASCULAR STUDY: CPT

## 2021-06-28 ENCOUNTER — NON-APPOINTMENT (OUTPATIENT)
Age: 81
End: 2021-06-28

## 2021-06-28 ENCOUNTER — APPOINTMENT (OUTPATIENT)
Dept: CARDIOLOGY | Facility: CLINIC | Age: 81
End: 2021-06-28
Payer: MEDICARE

## 2021-06-28 PROCEDURE — 93296 REM INTERROG EVL PM/IDS: CPT

## 2021-06-28 PROCEDURE — 93294 REM INTERROG EVL PM/LDLS PM: CPT

## 2021-07-14 ENCOUNTER — NON-APPOINTMENT (OUTPATIENT)
Age: 81
End: 2021-07-14

## 2021-09-22 RX ORDER — METOPROLOL SUCCINATE 50 MG/1
50 TABLET, EXTENDED RELEASE ORAL
Qty: 180 | Refills: 1 | Status: ACTIVE | COMMUNITY
Start: 1900-01-01 | End: 1900-01-01

## 2021-09-26 ENCOUNTER — NON-APPOINTMENT (OUTPATIENT)
Age: 81
End: 2021-09-26

## 2021-09-27 ENCOUNTER — APPOINTMENT (OUTPATIENT)
Dept: CARDIOLOGY | Facility: CLINIC | Age: 81
End: 2021-09-27
Payer: MEDICARE

## 2021-09-27 PROCEDURE — 93294 REM INTERROG EVL PM/LDLS PM: CPT

## 2021-09-27 PROCEDURE — 93296 REM INTERROG EVL PM/IDS: CPT

## 2021-10-12 ENCOUNTER — APPOINTMENT (OUTPATIENT)
Dept: CARDIOLOGY | Facility: CLINIC | Age: 81
End: 2021-10-12
Payer: MEDICARE

## 2021-10-12 VITALS
BODY MASS INDEX: 28.99 KG/M2 | TEMPERATURE: 97.5 F | SYSTOLIC BLOOD PRESSURE: 120 MMHG | HEIGHT: 72 IN | DIASTOLIC BLOOD PRESSURE: 68 MMHG | HEART RATE: 93 BPM | OXYGEN SATURATION: 99 % | WEIGHT: 214 LBS

## 2021-10-12 PROCEDURE — 99214 OFFICE O/P EST MOD 30 MIN: CPT

## 2021-10-12 NOTE — REASON FOR VISIT
[Follow-up Device Check] : follow-up device check visit [Other: ____] : [unfilled] [FreeTextEntry1] : Hosea is a 81-year-old male with a history of hypertension, dyslipidemia, TIA, COPD, migraine, allergy to aspirin with anaphylaxis, left hip JANE Dr Uriostegui [facility] [place], nonobstructive cath 2007, small inferior and apical MI Myoview stress 5/15, RBBB, asymptomatic rapid atrial fibrillation CHADS score is 6, successful DC cardioversion in May 2013, Medtronic PPM implant 10/11/18 for SSS/PAF on Eliquis, cardizem, NSVT normal LVEF .\par \par Multiple unresponsive episodes with facial flushing arm weakness and urinary bladder incontinence.  Patient following up with neurologist Dr. Champion, EEG recently completed, neurology recommending cardiac catheterization.\par \par Cardiac catheterization April 2021, LVEF 60%, nonobstructive coronaries\par \par Patient had neck and jaw pain with swelling lasting 30 minutes sent to [facility] [place]11/[place]21, normal troponin, nondiagnostic EKG with V-pacing, no further pain discharged for outpatient cardiology follow-up. \par \par Patient has dyspnea with moderate exertion. Cardiovascular review of symptoms is negative for exertional chest pain,  palpitations, dizziness or syncope. No PND or orthopnea leg edema. No bleeding or black stool.\par \par Patient is walking 10 minutes without exertional chest pain. Patient has been more physically active walking after replacement surgery December 2017\par \par Patient on nebulizer therapy for COPD.  Patient has increasing NSVT up to 30 seconds on remote monitoring, continue on lopressor 25mg twice daily, well tolerated.\par \par Patient seen by Dr Broussard 9/19/18 found to have A. fib ventricular rate 30s diltiazem reduced from 240mg to 180mg daily.  Currently remains in A. fib with slow ventricular rate.  Cardizem will be changed to 30mg twice per day with heart rate and blood pressure check prior to dose.  Patient will hold Cardizem for heart rate less than 50bpm. Holter monitor will be done today. Repeat labs ordered. Patient scheduled to followup with electrophysiologist Dr Burch. \par \par Patient admitted  [facility] diploplia r/o TIA, NSVT, normal LVEF, asymptomatic AF on Eliquis, cardizem. Cardiology was consulted for brief asymptomatic NSVT, Baseline AFib controlled ventricular rate.   2-D echo was repeated with normal LVEF 60%.  No change in medications\par \par Patient had asymptomatic recurrence of A. fib and successful elective DC cardioversion [facility] 8/29/17. EKG remains sinus rhythm first degree AV block LAD, RBBB, age undetermined IMI and AMI\par \par Patient had coughing and wheezing bronchitis and COPD exacerbation August 2017, following with pulmonologist Dr. Contreras.\par \par Pacemaker check  reveals normally functioning pacemaker adequate thresholds and battery.  Pacemaker will be checked every 3 months. \par \par Lexiscan Myoview stress test September 2020, small fixed inferior apical defect no ischemia, LVEF 42%, nondiagnostic EKG with baseline V pacing, hypotension with Lexiscan resolved with Aminophyllin\par \par Echocardiogram May 2020, LVEF 50%, diastolic dysfunction, mild to moderate MR, mild TR, normal RVSP\par \par Echocardiogram PCP 5/3/19, LVEF 65% normal RVSP moderate MR, mild TR\par \par Carotid Doppler PCP 5/3/19 mild nonobstructive plaque\par \par Lexascan Myoview stress September 2017, LVEF 50%, medium size inferior infarct without ischemia, unchanged, no anginal symptoms, baseline sinus rhythm, IVCD, LAD, 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 6/5/18 LVEF 60% mild MR and TR, normal PASP\par \par 2-D echo September 2017, LVEF 60%, mild diastolic dysfunction, mild MR TR, normal PASP\par \par Carotid and abdominal ultrasound September 2017, nonobstructive plaque, proximal aorta 2.7 centimeter\par

## 2021-10-12 NOTE — DISCUSSION/SUMMARY
[FreeTextEntry1] : Hosea is an 81-year-old male with medical history detailed above and active medical issues including: \par \par - No anginal symptoms, nonobstructive catheterization with normal LVEF April 2021.\par \par - Multiple unresponsive episodes with facial flushing arm weakness and urinary bladder incontinence.  Patient following up with neurologist Dr. Champion, EEG recently completed, ENT follow-up\par \par - Medtronic PPM implant 10/11/18 for SSS/PAF on Eliquis, Cardizem . Normal PPM check, next check 3 months\par \par - NSVT up to 30 seconds on remote monitoring in the past, continue lopressor 25mg twice daily, well tolerated.\par \par -  Asymptomatic PAF elective DCCV 8/29/17 and  DCCV 2013 maintaining NSR on Eliquis diltiazem \par \par ¨ COPD stable, followup with pulmonologist Dr Benoit\par \par -  Admission [facility] diplopia r/o TIA, normal head CT, ENT eval Dr Pereira possible sinus surgery\par \par ¨ HTN BP at guideline goal on Lopressor, lisinopril and Lasix\par \par ¨ Dyslipidemia on low dose Crestor 5mg every other day, history of statin myalgia, shoulder pain\par \par ¨ Hx TIA no residual defect\par \par - post op left hip JANE Dr Uriostegui 12/8/17 [facility]\par \par ¨ migraine stable \par \par ¨ 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 Patient educated on lifestyle and diet modification with low sodium low fat diet and avoidance of excessive alcohol. Patient is aware to call with any symptoms or concerns. \par  \par Patient will be seen in cardiology follow-up 6 months same day echocardiogram\par \par Current cardiac medications remain unchanged. Repeat labs will be ordered with PMD.\par \par Hosea will follow up with Dr. Broussard for primary care.\par \par \par

## 2021-10-28 ENCOUNTER — APPOINTMENT (OUTPATIENT)
Dept: CARDIOLOGY | Facility: CLINIC | Age: 81
End: 2021-10-28

## 2021-11-07 ENCOUNTER — NON-APPOINTMENT (OUTPATIENT)
Age: 81
End: 2021-11-07

## 2021-11-12 ENCOUNTER — APPOINTMENT (OUTPATIENT)
Dept: OTOLARYNGOLOGY | Facility: CLINIC | Age: 81
End: 2021-11-12
Payer: MEDICARE

## 2021-11-12 VITALS
HEIGHT: 72 IN | DIASTOLIC BLOOD PRESSURE: 85 MMHG | WEIGHT: 210 LBS | BODY MASS INDEX: 28.44 KG/M2 | SYSTOLIC BLOOD PRESSURE: 132 MMHG | HEART RATE: 91 BPM | TEMPERATURE: 97.7 F

## 2021-11-12 DIAGNOSIS — K11.20 SIALOADENITIS, UNSPECIFIED: ICD-10-CM

## 2021-11-12 DIAGNOSIS — R60.9 EDEMA, UNSPECIFIED: ICD-10-CM

## 2021-11-12 PROCEDURE — 99203 OFFICE O/P NEW LOW 30 MIN: CPT

## 2021-11-12 NOTE — END OF VISIT
[FreeTextEntry3] : I personally saw and examined GOKUL CHATMAN in detail. I spoke to LEVON Graham regarding the assessment and plan of care.  I preformed the procedures and I reviewed the above assessment and plan of care, and agree. I have made changes in changes in the body of the note where appropriate.\par \par

## 2021-11-12 NOTE — ASSESSMENT
[FreeTextEntry1] : 82 y/o M who presents with b/l parotitis, on exam clear flow b/l however with multiple episodes a year with no durable relief. \par - Will proceed with regiment to increase salivary flow to reduce pooling in the gland and washout any possible obstruction if possible. Recommended hydration, regular massage, sour candies, and warm compress\par - Discussed options for recurrent parotid sialoadenitis- observation with conservative management versus interventional sialoendoscopy vs excision of gland. \par Discussed details of the regiment/procedures and risks of each including but not limited to:\par interventional sialoendoscopy - bleeding, infection, scarring, inability to remove stone, ductal perforation/avulsion, injury to surrounding nerves, seroma, persistent or worsening of sx.\par Combined approach - increased risk of bleeding and infections injury to surrounding nerves including facial nerved and sensory nerves. \par complete gland removal - external scar, injury to facial nerve etc. \par - pt would like to do conservative Tx for now, and will let us know if we would like to proceed with the surgery.

## 2021-11-12 NOTE — HISTORY OF PRESENT ILLNESS
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 \par Pt admits to about 20-25 episodes a year. \par Pt has tried drinking water, and sour candies with no relief. \par +dry mouth\par no imaging done \par pt denies dry eyes, autoimmune d/o

## 2021-11-12 NOTE — CONSULT LETTER
[Please see my note below.] : Please see my note below. [FreeTextEntry1] : Dear Dr. ALYSSA COOPER \par I had the pleasure of evaluating your patient GOKUL CHATMAN, thank you for allowing us to participate in their care. please see full note detailing our visit below.\par If you have any questions, please do not hesitate to call me and I would be happy to discuss further. \par \par Merritt Perales M.D.\par Attending Physician,  \par Department of Otolaryngology - Head and Neck Surgery\par [facility] \par Office: [phone]\par Fax: [phone]\par \par

## 2021-11-12 NOTE — PHYSICAL EXAM
[Midline] : trachea located in midline position [Normal] : no rashes [de-identified] : clear flow b/l

## 2021-12-02 ENCOUNTER — APPOINTMENT (OUTPATIENT)
Dept: CARDIOLOGY | Facility: CLINIC | Age: 81
End: 2021-12-02

## 2021-12-28 ENCOUNTER — NON-APPOINTMENT (OUTPATIENT)
Age: 81
End: 2021-12-28

## 2021-12-29 ENCOUNTER — APPOINTMENT (OUTPATIENT)
Dept: CARDIOLOGY | Facility: CLINIC | Age: 81
End: 2021-12-29
Payer: MEDICARE

## 2021-12-29 PROCEDURE — 93296 REM INTERROG EVL PM/IDS: CPT | Mod: NC

## 2021-12-29 PROCEDURE — 93294 REM INTERROG EVL PM/LDLS PM: CPT | Mod: NC

## 2022-01-06 ENCOUNTER — APPOINTMENT (OUTPATIENT)
Dept: CARDIOLOGY | Facility: CLINIC | Age: 82
End: 2022-01-06

## 2022-03-30 ENCOUNTER — APPOINTMENT (OUTPATIENT)
Dept: CARDIOLOGY | Facility: CLINIC | Age: 82
End: 2022-03-30
Payer: MEDICARE

## 2022-03-30 ENCOUNTER — NON-APPOINTMENT (OUTPATIENT)
Age: 82
End: 2022-03-30

## 2022-03-30 PROCEDURE — 93294 REM INTERROG EVL PM/LDLS PM: CPT

## 2022-03-30 PROCEDURE — 93296 REM INTERROG EVL PM/IDS: CPT

## 2022-04-06 ENCOUNTER — APPOINTMENT (OUTPATIENT)
Dept: CARDIOLOGY | Facility: CLINIC | Age: 82
End: 2022-04-06
Payer: MEDICARE

## 2022-04-06 VITALS
HEIGHT: 72 IN | WEIGHT: 208 LBS | SYSTOLIC BLOOD PRESSURE: 122 MMHG | TEMPERATURE: 98.2 F | DIASTOLIC BLOOD PRESSURE: 64 MMHG | OXYGEN SATURATION: 99 % | BODY MASS INDEX: 28.17 KG/M2 | HEART RATE: 95 BPM

## 2022-04-06 PROCEDURE — 99215 OFFICE O/P EST HI 40 MIN: CPT

## 2022-04-06 PROCEDURE — 93306 TTE W/DOPPLER COMPLETE: CPT

## 2022-04-06 RX ORDER — ZINC OXIDE 13 %
CREAM (GRAM) TOPICAL
Refills: 0 | Status: ACTIVE | COMMUNITY

## 2022-04-06 RX ORDER — TIOTROPIUM BROMIDE INHALATION SPRAY 3.12 UG/1
2.5 SPRAY, METERED RESPIRATORY (INHALATION)
Qty: 12 | Refills: 0 | Status: DISCONTINUED | COMMUNITY
Start: 2020-06-26 | End: 2022-04-06

## 2022-04-06 RX ORDER — TIOTROPIUM BROMIDE 18 UG/1
18 CAPSULE ORAL; RESPIRATORY (INHALATION)
Refills: 0 | Status: DISCONTINUED | COMMUNITY
End: 2022-04-06

## 2022-04-06 RX ORDER — AZELASTINE HYDROCHLORIDE 205.5 UG/1
0.15 SPRAY, METERED NASAL
Qty: 30 | Refills: 0 | Status: DISCONTINUED | COMMUNITY
Start: 2017-03-15 | End: 2022-04-06

## 2022-04-06 RX ORDER — LEVOCETIRIZINE DIHYDROCHLORIDE 5 MG/1
5 TABLET ORAL
Qty: 30 | Refills: 0 | Status: DISCONTINUED | COMMUNITY
Start: 2018-05-05 | End: 2022-04-06

## 2022-04-06 RX ORDER — EPINEPHRINE 0.3 MG/.3ML
0.3 INJECTION INTRAMUSCULAR
Qty: 2 | Refills: 0 | Status: DISCONTINUED | COMMUNITY
Start: 2020-10-15 | End: 2022-04-06

## 2022-04-06 RX ORDER — CYCLOBENZAPRINE HYDROCHLORIDE 5 MG/1
5 TABLET, FILM COATED ORAL
Qty: 20 | Refills: 0 | Status: DISCONTINUED | COMMUNITY
Start: 2020-07-20 | End: 2022-04-06

## 2022-04-06 RX ORDER — IPRATROPIUM BROMIDE 21 UG/1
0.03 SPRAY NASAL
Qty: 30 | Refills: 0 | Status: DISCONTINUED | COMMUNITY
Start: 2018-04-16 | End: 2022-04-06

## 2022-04-06 RX ORDER — OMEPRAZOLE 40 MG/1
40 CAPSULE, DELAYED RELEASE ORAL
Qty: 30 | Refills: 0 | Status: DISCONTINUED | COMMUNITY
Start: 2017-07-25 | End: 2022-04-06

## 2022-04-06 RX ORDER — BUDESONIDE AND FORMOTEROL FUMARATE DIHYDRATE 160; 4.5 UG/1; UG/1
160-4.5 AEROSOL RESPIRATORY (INHALATION)
Qty: 30 | Refills: 0 | Status: DISCONTINUED | COMMUNITY
Start: 2020-11-02 | End: 2022-04-06

## 2022-04-06 RX ORDER — TOBRAMYCIN AND DEXAMETHASONE 3; 1 MG/ML; MG/ML
0.3-0.1 SUSPENSION/ DROPS OPHTHALMIC
Qty: 2 | Refills: 0 | Status: DISCONTINUED | COMMUNITY
Start: 2020-09-29 | End: 2022-04-06

## 2022-04-06 RX ORDER — FLUTICASONE PROPIONATE AND SALMETEROL 50; 500 UG/1; UG/1
500-50 POWDER RESPIRATORY (INHALATION)
Refills: 0 | Status: DISCONTINUED | COMMUNITY
End: 2022-04-06

## 2022-04-06 RX ORDER — POLYMYXIN B SULFATE AND TRIMETHOPRIM 10000; 1 [USP'U]/ML; MG/ML
10000-0.1 SOLUTION OPHTHALMIC
Qty: 10 | Refills: 0 | Status: DISCONTINUED | COMMUNITY
Start: 2021-08-25 | End: 2022-04-06

## 2022-04-06 RX ORDER — ROSUVASTATIN CALCIUM 5 MG/1
5 TABLET, FILM COATED ORAL
Qty: 45 | Refills: 0 | Status: DISCONTINUED | COMMUNITY
Start: 2017-09-26 | End: 2022-04-06

## 2022-04-06 NOTE — DISCUSSION/SUMMARY
[FreeTextEntry1] : Hosea is an 81-year-old male with medical history detailed above and active medical issues including: \par \par - No anginal symptoms, nonobstructive catheterization with normal LVEF April 2021.\par \par - Multiple unresponsive episodes with facial flushing arm weakness and urinary bladder incontinence.  Patient following up with neurologist Dr. Champion, EEG recently completed, ENT follow-up\par \par - Medtronic PPM implant 10/11/18 for SSS/PAF on Eliquis, Cardizem . Normal PPM check, next check 3 months\par \par - NSVT up to 30 seconds on remote monitoring in the past, continue lopressor 25mg twice daily, well tolerated.\par \par -  Asymptomatic PAF elective DCCV 8/29/17 and  DCCV 2013 maintaining NSR on Eliquis diltiazem \par \par ¨ COPD stable, followup with pulmonologist Dr Benoit\par \par -  Admission [facility] diplopia r/o TIA, normal head CT, ENT eval Dr Pereira possible sinus surgery\par \par ¨ HTN BP at guideline goal on Lopressor, lisinopril and Lasix\par \par ¨ Dyslipidemia on low dose Crestor 5mg every other day, history of statin myalgia, shoulder pain\par \par ¨ Hx TIA no residual defect\par \par - post op left hip JANE Dr Uriostegui 12/8/17 [facility]\par \par ¨ migraine stable \par \par ¨ 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 Patient educated on lifestyle and diet modification with low sodium low fat diet and avoidance of excessive alcohol. Patient is aware to call with any symptoms or concerns. \par  \par Patient will be seen in cardiology follow-up 6 months. Current cardiac medications remain unchanged. Repeat labs will be ordered with PMD.\par \par Hosea will follow up with Dr. Broussard for primary care.\par \par \par

## 2022-04-06 NOTE — REASON FOR VISIT
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 Cardiovascular review of symptoms is negative for exertional chest pain,  palpitations, dizziness or syncope. No PND or orthopnea leg edema. No bleeding or black stool.\par \par Patient is walking 10 minutes without exertional chest pain. Patient has been more physically active walking after replacement surgery December 2017\par \par Multiple unresponsive episodes with facial flushing arm weakness and urinary bladder incontinence.  Patient following up with neurologist Dr. Champion, EEG recently completed, neurology recommending cardiac catheterization.\par \par Echocardiogram April 2022 LVEF 55%, apical akinesis, mild to moderate MR, mild TR, mild PAH\par \par Cardiac catheterization April 2021, LVEF 60%, nonobstructive coronaries\par \par Patient had neck and jaw pain with swelling lasting 30 minutes sent to [facility] [place] 1/11/2021, normal troponin, nondiagnostic EKG with V-pacing, no further pain discharged for outpatient cardiology follow-up. \par \par Patient on nebulizer therapy for COPD.  Patient has increasing NSVT up to 30 seconds on remote monitoring, continue on lopressor 25mg twice daily, well tolerated.\par \par Patient seen by Dr Broussard 9/19/18 found to have A. fib ventricular rate 30s diltiazem reduced from 240mg to 180mg daily.  Currently remains in A. fib with slow ventricular rate.  Cardizem will be changed to 30mg twice per day with heart rate and blood pressure check prior to dose.  Patient will hold Cardizem for heart rate less than 50bpm. Holter monitor will be done today. Repeat labs ordered. Patient scheduled to followup with electrophysiologist Dr Burch. \par \par Patient admitted  [facility] diploplia r/o TIA, NSVT, normal LVEF, asymptomatic AF on Eliquis, cardizem. Cardiology was consulted for brief asymptomatic NSVT, Baseline AFib controlled ventricular rate.   2-D echo was repeated with normal LVEF 60%.  No change in medications\par \par Patient had asymptomatic recurrence of A. fib and successful elective DC cardioversion [facility] 8/29/17. EKG remains sinus rhythm first degree AV block LAD, RBBB, age undetermined IMI and AMI\par \par Patient had coughing and wheezing bronchitis and COPD exacerbation August 2017, following with pulmonologist Dr. Contreras.\par \par Pacemaker check  reveals normally functioning pacemaker adequate thresholds and battery.  Pacemaker will be checked every 3 months. \par \par Lexiscan Myoview stress test September 2020, small fixed inferior apical defect no ischemia, LVEF 42%, nondiagnostic EKG with baseline V pacing, hypotension with Lexiscan resolved with Aminophyllin\par \par Echocardiogram May 2020, LVEF 50%, diastolic dysfunction, mild to moderate MR, mild TR, normal RVSP\par \par Echocardiogram PCP 5/3/19, LVEF 65% normal RVSP moderate MR, mild TR\par \par Carotid Doppler PCP 5/3/19 mild nonobstructive plaque\par \par Lexascan Myoview stress September 2017, LVEF 50%, medium size inferior infarct without ischemia, unchanged, no anginal symptoms, baseline sinus rhythm, IVCD, LAD, 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

## 2022-04-06 NOTE — PHYSICAL EXAM
[General Appearance - Well Developed] : well developed [Normal Appearance] : normal appearance [Well Groomed] : well groomed [General Appearance - Well Nourished] : well nourished [No Deformities] : no deformities [General Appearance - In No Acute Distress] : no acute distress [Eyelids - No Xanthelasma] : the eyelids demonstrated no xanthelasmas [Normal Oral Mucosa] : normal oral mucosa [No Oral Pallor] : no oral pallor [No Oral Cyanosis] : no oral cyanosis [Normal Jugular Venous A Waves Present] : normal jugular venous A waves present [Normal Jugular Venous V Waves Present] : normal jugular venous V waves present [No Jugular Venous Solano A Waves] : no jugular venous solano A waves [Respiration, Rhythm And Depth] : normal respiratory rhythm and effort [Exaggerated Use Of Accessory Muscles For Inspiration] : no accessory muscle use [Auscultation Breath Sounds / Voice Sounds] : lungs were clear to auscultation bilaterally [Murmurs] : no murmurs present [Abdomen Soft] : soft [Abdomen Tenderness] : non-tender [Abdomen Mass (___ Cm)] : no abdominal mass palpated [Abnormal Walk] : normal gait [Gait - Sufficient For Exercise Testing] : the gait was sufficient for exercise testing [Nail Clubbing] : no clubbing of the fingernails [Cyanosis, Localized] : no localized cyanosis [Petechial Hemorrhages (___cm)] : no petechial hemorrhages [] : no ischemic changes [Oriented To Time, Place, And Person] : oriented to person, place, and time [Affect] : the affect was normal [Mood] : the mood was normal [No Anxiety] : not feeling anxious [Well Developed] : well developed [Well Nourished] : well nourished [No Acute Distress] : no acute distress [Normal Conjunctiva] : normal conjunctiva [Normal Venous Pressure] : normal venous pressure [No Carotid Bruit] : no carotid bruit [Normal S1, S2] : normal S1, S2 [No Murmur] : no murmur [No Rub] : no rub [No Gallop] : no gallop [Clear Lung Fields] : clear lung fields [Good Air Entry] : good air entry [No Respiratory Distress] : no respiratory distress  [Soft] : abdomen soft [Non Tender] : non-tender [No Masses/organomegaly] : no masses/organomegaly [Normal Bowel Sounds] : normal bowel sounds [Normal Gait] : normal gait [No Edema] : no edema [No Cyanosis] : no cyanosis [No Clubbing] : no clubbing [No Varicosities] : no varicosities [No Rash] : no rash [No Skin Lesions] : no skin lesions [Moves all extremities] : moves all extremities [No Focal Deficits] : no focal deficits [Normal Speech] : normal speech [Alert and Oriented] : alert and oriented [Normal memory] : normal memory [FreeTextEntry1] : improved walking after hip replacement surgery Dec 2017

## 2022-04-06 NOTE — REVIEW OF SYSTEMS
[Dizziness] : dizziness [Negative] : Heme/Lymph [Dyspnea on exertion] : dyspnea during exertion [Cough] : cough [de-identified] : Multiple syncopal episodes

## 2022-05-27 ENCOUNTER — APPOINTMENT (OUTPATIENT)
Dept: CARDIOLOGY | Facility: CLINIC | Age: 82
End: 2022-05-27
Payer: MEDICARE

## 2022-05-27 VITALS
WEIGHT: 208 LBS | OXYGEN SATURATION: 97 % | HEART RATE: 90 BPM | BODY MASS INDEX: 28.17 KG/M2 | DIASTOLIC BLOOD PRESSURE: 72 MMHG | HEIGHT: 72 IN | TEMPERATURE: 97.3 F | SYSTOLIC BLOOD PRESSURE: 128 MMHG

## 2022-05-27 DIAGNOSIS — Z01.818 ENCOUNTER FOR OTHER PREPROCEDURAL EXAMINATION: ICD-10-CM

## 2022-05-27 PROCEDURE — 93246 EXT ECG>7D<15D RECORDING: CPT

## 2022-05-27 PROCEDURE — 99215 OFFICE O/P EST HI 40 MIN: CPT

## 2022-05-27 RX ORDER — ALBUTEROL SULFATE 90 UG/1
108 AEROSOL, METERED RESPIRATORY (INHALATION)
Refills: 0 | Status: DISCONTINUED | COMMUNITY
End: 2022-05-27

## 2022-05-27 NOTE — REASON FOR VISIT
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 He states his last episode was on 5/24. They only happen when he is eating. He gets facial flushing, feels like he is on fire then he loses control of his bowel and bladder. He passes out for approx 15-20 sec. These episodes have been going on for the past year.\par  Patient followed up with neurologist Dr. Champion, EEG recently completed, neurology recommending cardiac catheterization which was done in 4/2021 showing  nonobstructive coronaries\par \par Patient has dyspnea with moderate exertion unchanged and stable. Cardiovascular review of symptoms is negative for exertional chest pain,  palpitations, dizziness or syncope. No PND or orthopnea leg edema. No bleeding or black stool.\par \par Patient on nebulizer therapy for COPD. pulmonologist Dr. Contreras.\par  Patient has increasing NSVT up to 30 seconds on remote monitoring, continue on lopressor 25mg twice daily, well tolerated.\par \par Historical perspective:\par Patient seen by Dr Broussard 9/19/18 found to have A. fib ventricular rate 30s diltiazem reduced from 240mg to 180mg daily.  Currently remains in A. fib with slow ventricular rate.  Cardizem  30mg twice per day with heart rate and blood pressure check prior to dose.  Patient will hold Cardizem for heart rate less than 50bpm. Electrophysiologist Dr Burch. \par \par Patient admitted  [facility] diploplia r/o TIA, NSVT, normal LVEF, asymptomatic AF on Eliquis, cardizem. Cardiology was consulted for brief asymptomatic NSVT, Baseline AFib controlled ventricular rate.   2-D echo was repeated with normal LVEF 60%.  No change in medications\par \par TESTING: \par Echocardiogram April 2022 LVEF 55%, apical akinesis, mild to moderate MR, mild TR, mild PAH\par Pacemaker check  reveals normally functioning pacemaker adequate thresholds and battery.  Pacemaker will be checked every 3 months. \par \par Lexiscan Myoview stress test September 2020, small fixed inferior apical defect no ischemia, LVEF 42%, nondiagnostic EKG with baseline V pacing, hypotension with Lexiscan resolved with Aminophyllin\par \par Echocardiogram May 2020, LVEF 50%, diastolic dysfunction, mild to moderate MR, mild TR, normal RVSP\par \par Echocardiogram PCP 5/3/19, LVEF 65% normal RVSP moderate MR, mild TR\par \par Carotid Doppler PCP 5/3/19 mild nonobstructive plaque\par \par Lexascan Myoview stress September 2017, LVEF 50%, medium size inferior infarct without ischemia, unchanged, no anginal symptoms, baseline sinus rhythm, IVCD, LAD, old ASWMI\par \par 2-D echo [facility] 6/5/18 LVEF 60% mild MR and TR, normal PASP\par \par 2-D echo September 2017, LVEF 60%, mild diastolic dysfunction, mild MR TR, normal PASP\par \par Carotid and abdominal ultrasound September 2017, nonobstructive plaque, proximal aorta 2.7 centimeter\par

## 2022-05-27 NOTE — PHYSICAL EXAM
[Well Developed] : well developed [Well Nourished] : well nourished [No Acute Distress] : no acute distress [Normal Venous Pressure] : normal venous pressure [No Carotid Bruit] : no carotid bruit [Normal S1, S2] : normal S1, S2 [No Gallop] : no gallop [Clear Lung Fields] : clear lung fields [Good Air Entry] : good air entry [No Respiratory Distress] : no respiratory distress  [Normal Gait] : normal gait [No Rash] : no rash [Moves all extremities] : moves all extremities [Normal Speech] : normal speech [Alert and Oriented] : alert and oriented [General Appearance - Well Developed] : well developed [Normal Appearance] : normal appearance [Well Groomed] : well groomed [General Appearance - Well Nourished] : well nourished [No Deformities] : no deformities [General Appearance - In No Acute Distress] : no acute distress [Normal Conjunctiva] : the conjunctiva exhibited no abnormalities [Eyelids - No Xanthelasma] : the eyelids demonstrated no xanthelasmas [Normal Oral Mucosa] : normal oral mucosa [No Oral Pallor] : no oral pallor [No Oral Cyanosis] : no oral cyanosis [Normal Jugular Venous A Waves Present] : normal jugular venous A waves present [Normal Jugular Venous V Waves Present] : normal jugular venous V waves present [No Jugular Venous Solano A Waves] : no jugular venous solano A waves [Respiration, Rhythm And Depth] : normal respiratory rhythm and effort [Exaggerated Use Of Accessory Muscles For Inspiration] : no accessory muscle use [Auscultation Breath Sounds / Voice Sounds] : lungs were clear to auscultation bilaterally [Murmurs] : no murmurs present [Abdomen Soft] : soft [Abdomen Tenderness] : non-tender [Abdomen Mass (___ Cm)] : no abdominal mass palpated [Abnormal Walk] : normal gait [Gait - Sufficient For Exercise Testing] : the gait was sufficient for exercise testing [FreeTextEntry1] : improved walking after hip replacement surgery Dec 2017 [Nail Clubbing] : no clubbing of the fingernails [Cyanosis, Localized] : no localized cyanosis [Petechial Hemorrhages (___cm)] : no petechial hemorrhages [] : no ischemic changes [Oriented To Time, Place, And Person] : oriented to person, place, and time [Affect] : the affect was normal [Mood] : the mood was normal [No Anxiety] : not feeling anxious [de-identified] : irreg irreg S1 S2,  [de-identified] : trace ble

## 2022-05-27 NOTE — REVIEW OF SYSTEMS
[Dyspnea on exertion] : dyspnea during exertion [Cough] : cough [Dizziness] : dizziness [Negative] : Heme/Lymph [FreeTextEntry5] : see hpi [de-identified] : Multiple syncopal episodes

## 2022-05-27 NOTE — DISCUSSION/SUMMARY
[FreeTextEntry1] : GOKUL CHATMAN is a 82 year old M who presents today May 27, 2022 with the above history and the following active issues: \par \par - No anginal symptoms, nonobstructive catheterization with normal LVEF April 2021.\par \par - Multiple unresponsive episodes with facial flushing arm weakness and urinary bladder incontinence.  Patient following up with neurologist Dr. Champion, EEG recently completed, \par -will place a Zio AT x 2 weeks. If he is to not have an episode we will replace it for another 2 weeks Zio AT.\par -Labs to include CBC CMP Urine VMA, Urine Metanephrines & Catecholamines\par \par - Medtronic PPM implant 10/11/18 for SSS/PAF on Eliquis, Cardizem . \par Brief interrogation today shows No Episodes on date on syncope. \par \par - NSVT up to 30 seconds on remote monitoring in the past, continue Lopressor 25mg twice daily, well tolerated. Brief Episode on 5/7\par \par -  Asymptomatic PAF elective DCCV 8/29/17 and  DCCV 2013; on Eliquis diltiazem \par \par ¨ COPD stable, followup with pulmonologist Dr Benoit\par \par -  Admission [facility] diplopia r/o TIA, normal head CT, ENT eval Dr Pereira possible sinus surgery?\par ¨ Hx TIA no residual defect\par \par ¨ HTN BP at guideline goal on Lopressor, lisinopril and Lasix. 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 Patient educated on lifestyle and diet modification with low sodium low fat diet and avoidance of excessive alcohol. Patient is aware to call with any symptoms or concerns. \par \par Gokul will follow up with Dr. Broussard for primary care.\par \par Pt will follow up in 4 weeks with Dr Valentino with a Full PPM interrogation. He will do this in office instead of Remotely \par F/U after testing to review results.\par Discussed red flag symptoms, which would warrant sooner or emergent medical evaluation.\par Any questions and concerns were addressed and resolved. \par \par Sincerely,\par \par Norma Tan ANP-C\par Patients history, testing, and plan reviewed with supervising MD: Dr. Teo Marie \par \par \par

## 2022-05-30 ENCOUNTER — NON-APPOINTMENT (OUTPATIENT)
Age: 82
End: 2022-05-30

## 2022-06-17 ENCOUNTER — NON-APPOINTMENT (OUTPATIENT)
Age: 82
End: 2022-06-17

## 2022-06-20 ENCOUNTER — APPOINTMENT (OUTPATIENT)
Dept: CARDIOLOGY | Facility: CLINIC | Age: 82
End: 2022-06-20

## 2022-06-20 PROCEDURE — 93248 EXT ECG>7D<15D REV&INTERPJ: CPT

## 2022-06-27 ENCOUNTER — OUTPATIENT (OUTPATIENT)
Dept: OUTPATIENT SERVICES | Facility: HOSPITAL | Age: 82
LOS: 1 days | End: 2022-06-27

## 2022-06-27 DIAGNOSIS — E78.2 MIXED HYPERLIPIDEMIA: ICD-10-CM

## 2022-06-27 DIAGNOSIS — I10 ESSENTIAL (PRIMARY) HYPERTENSION: ICD-10-CM

## 2022-06-27 DIAGNOSIS — I48.91 UNSPECIFIED ATRIAL FIBRILLATION: ICD-10-CM

## 2022-06-29 ENCOUNTER — APPOINTMENT (OUTPATIENT)
Dept: CARDIOLOGY | Facility: CLINIC | Age: 82
End: 2022-06-29

## 2022-06-29 ENCOUNTER — NON-APPOINTMENT (OUTPATIENT)
Age: 82
End: 2022-06-29

## 2022-06-29 PROCEDURE — 93294 REM INTERROG EVL PM/LDLS PM: CPT

## 2022-06-29 PROCEDURE — 93296 REM INTERROG EVL PM/IDS: CPT

## 2022-06-30 ENCOUNTER — OUTPATIENT (OUTPATIENT)
Dept: OUTPATIENT SERVICES | Facility: HOSPITAL | Age: 82
LOS: 1 days | Discharge: HOME | End: 2022-06-30

## 2022-06-30 PROCEDURE — 93010 ELECTROCARDIOGRAM REPORT: CPT

## 2022-06-30 PROCEDURE — 93458 L HRT ARTERY/VENTRICLE ANGIO: CPT | Mod: 26

## 2022-07-05 DIAGNOSIS — Z79.01 LONG TERM (CURRENT) USE OF ANTICOAGULANTS: ICD-10-CM

## 2022-07-05 DIAGNOSIS — Z87.891 PERSONAL HISTORY OF NICOTINE DEPENDENCE: ICD-10-CM

## 2022-07-05 DIAGNOSIS — I48.20 CHRONIC ATRIAL FIBRILLATION, UNSPECIFIED: ICD-10-CM

## 2022-07-05 DIAGNOSIS — E78.5 HYPERLIPIDEMIA, UNSPECIFIED: ICD-10-CM

## 2022-07-05 DIAGNOSIS — K21.9 GASTRO-ESOPHAGEAL REFLUX DISEASE WITHOUT ESOPHAGITIS: ICD-10-CM

## 2022-07-05 DIAGNOSIS — I47.2 VENTRICULAR TACHYCARDIA: ICD-10-CM

## 2022-07-05 DIAGNOSIS — I49.3 VENTRICULAR PREMATURE DEPOLARIZATION: ICD-10-CM

## 2022-07-05 DIAGNOSIS — G43.909 MIGRAINE, UNSPECIFIED, NOT INTRACTABLE, WITHOUT STATUS MIGRAINOSUS: ICD-10-CM

## 2022-07-05 DIAGNOSIS — I25.2 OLD MYOCARDIAL INFARCTION: ICD-10-CM

## 2022-07-05 DIAGNOSIS — J44.9 CHRONIC OBSTRUCTIVE PULMONARY DISEASE, UNSPECIFIED: ICD-10-CM

## 2022-07-05 DIAGNOSIS — R06.00 DYSPNEA, UNSPECIFIED: ICD-10-CM

## 2022-07-05 DIAGNOSIS — Z99.81 DEPENDENCE ON SUPPLEMENTAL OXYGEN: ICD-10-CM

## 2022-07-05 DIAGNOSIS — R32 UNSPECIFIED URINARY INCONTINENCE: ICD-10-CM

## 2022-07-05 DIAGNOSIS — I45.10 UNSPECIFIED RIGHT BUNDLE-BRANCH BLOCK: ICD-10-CM

## 2022-07-05 DIAGNOSIS — I10 ESSENTIAL (PRIMARY) HYPERTENSION: ICD-10-CM

## 2022-07-05 DIAGNOSIS — I25.10 ATHEROSCLEROTIC HEART DISEASE OF NATIVE CORONARY ARTERY WITHOUT ANGINA PECTORIS: ICD-10-CM

## 2022-07-05 DIAGNOSIS — Z95.0 PRESENCE OF CARDIAC PACEMAKER: ICD-10-CM

## 2022-07-05 DIAGNOSIS — J45.909 UNSPECIFIED ASTHMA, UNCOMPLICATED: ICD-10-CM

## 2022-07-05 DIAGNOSIS — Z86.73 PERSONAL HISTORY OF TRANSIENT ISCHEMIC ATTACK (TIA), AND CEREBRAL INFARCTION WITHOUT RESIDUAL DEFICITS: ICD-10-CM

## 2022-07-05 DIAGNOSIS — R53.1 WEAKNESS: ICD-10-CM

## 2022-07-08 ENCOUNTER — APPOINTMENT (OUTPATIENT)
Dept: CARDIOLOGY | Facility: CLINIC | Age: 82
End: 2022-07-08

## 2022-07-08 VITALS
SYSTOLIC BLOOD PRESSURE: 110 MMHG | TEMPERATURE: 97.1 F | BODY MASS INDEX: 27.09 KG/M2 | DIASTOLIC BLOOD PRESSURE: 80 MMHG | WEIGHT: 200 LBS | HEART RATE: 86 BPM | OXYGEN SATURATION: 97 % | HEIGHT: 72 IN

## 2022-07-08 PROCEDURE — 99214 OFFICE O/P EST MOD 30 MIN: CPT

## 2022-07-08 RX ORDER — BUDESONIDE, GLYCOPYRROLATE, AND FORMOTEROL FUMARATE 160; 9; 4.8 UG/1; UG/1; UG/1
160-9-4.8 AEROSOL, METERED RESPIRATORY (INHALATION)
Qty: 32 | Refills: 0 | Status: ACTIVE | COMMUNITY
Start: 2022-03-22

## 2022-08-10 ENCOUNTER — APPOINTMENT (OUTPATIENT)
Dept: CARDIOLOGY | Facility: CLINIC | Age: 82
End: 2022-08-10

## 2022-08-10 VITALS
DIASTOLIC BLOOD PRESSURE: 80 MMHG | OXYGEN SATURATION: 96 % | HEIGHT: 72 IN | SYSTOLIC BLOOD PRESSURE: 136 MMHG | HEART RATE: 88 BPM | TEMPERATURE: 97.1 F | BODY MASS INDEX: 26.55 KG/M2 | WEIGHT: 196 LBS

## 2022-08-10 DIAGNOSIS — I45.10 UNSPECIFIED RIGHT BUNDLE-BRANCH BLOCK: ICD-10-CM

## 2022-08-10 DIAGNOSIS — R06.02 SHORTNESS OF BREATH: ICD-10-CM

## 2022-08-10 PROCEDURE — 93242 EXT ECG>48HR<7D RECORDING: CPT | Mod: 59

## 2022-08-10 PROCEDURE — 99215 OFFICE O/P EST HI 40 MIN: CPT

## 2022-08-10 NOTE — DISCUSSION/SUMMARY
[FreeTextEntry1] : Hosea is an 81-year-old male with medical history detailed above and active medical issues including: \par \par - Erythema and facial swelling, nausea vomiting diarrhea after meals, possible angioedema lisinopril discontinued 8/8/2022.  Patient referred to allergist Dr. Leoncio John [phone]. \par \par - Multiple unresponsive episodes with facial flushing arm weakness and urinary bladder incontinence.  Patient following up with neurologist Dr. Champion, EEG recently completed, ENT follow-up\par \par - No anginal symptoms, nonobstructive catheterization with normal LVEF April 2021 and June 2022.\par \par - Medtronic PPM implant 10/11/18 for SSS/PAF on Eliquis, Cardizem . Normal PPM check, next check 3 months.  PAF versus junctional rhythm seen on current EKG. Zio patch 1 week heart monitor started today with TEB follow-up\par \par - NSVT up to 30 seconds on remote monitoring in the past, continue lopressor 25mg twice daily, well tolerated.\par \par -  Asymptomatic PAF elective DCCV 8/29/17 and  DCCV 2013 maintaining NSR on Eliquis diltiazem \par \par ¨ COPD stable, followup with pulmonologist Dr Benoit\par \par -  Admission [facility] diplopia r/o TIA, normal head CT, ENT eval Dr Pereira possible sinus surgery\par \par ¨ HTN BP at guideline goal on Lopressor, lisinopril and Lasix\par \par ¨ Dyslipidemia on low dose Crestor 5mg every other day, history of statin myalgia, shoulder pain\par \par ¨ Hx TIA no residual defect\par \par - post op left hip JANE Dr Uriostegui 12/8/17 [facility]\par \par ¨ migraine stable \par \par ¨ 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 Patient educated on lifestyle and diet modification with low sodium low fat diet and avoidance of excessive alcohol. Patient is aware to call with any symptoms or concerns. \par  \par Patient will be seen in cardiology follow-up 6 months. Current cardiac medications remain unchanged. Repeat labs will be ordered with PMD.\par \par Hosea will follow up with Dr. Broussard for primary care.\par \par \par

## 2022-08-10 NOTE — REVIEW OF SYSTEMS
[Dyspnea on exertion] : dyspnea during exertion [Cough] : cough [Dizziness] : dizziness [Negative] : Heme/Lymph [de-identified] : Multiple syncopal episodes

## 2022-08-10 NOTE — REASON FOR VISIT
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 Cardiovascular review of symptoms is negative for exertional chest pain,  palpitations, dizziness or syncope. No PND or orthopnea leg edema. No bleeding or black stool.\par \par Patient is walking 10 minutes without exertional chest pain. Patient has been more physically active walking after replacement surgery December 2017\par \par Multiple unresponsive episodes with facial flushing arm weakness and urinary bladder incontinence.  Patient following up with neurologist Dr. Champion, EEG recently completed, neurology recommending cardiac catheterization.\par \par Echocardiogram April 2022 LVEF 55%, apical akinesis, mild to moderate MR, mild TR, mild PAH\par \par Cardiac catheterization April 2021, LVEF 60%, nonobstructive coronaries\par \par Patient had neck and jaw pain with swelling lasting 30 minutes [place] [facility] [place], normal troponin, nondiagnostic EKG with V-pacing, no further pain discharged for outpatient cardiology follow-up. \par \par Patient on nebulizer therapy for COPD.  Patient has increasing NSVT up to 30 seconds on remote monitoring, continue on lopressor 25mg twice daily, well tolerated.\par \par Patient seen by Dr Broussard 9/19/18 found to have A. fib ventricular rate 30s diltiazem reduced from 240mg to 180mg daily.  Currently remains in A. fib with slow ventricular rate.  Cardizem will be changed to 30mg twice per day with heart rate and blood pressure check prior to dose.  Patient will hold Cardizem for heart rate less than 50bpm. Holter monitor will be done today. Repeat labs ordered. Patient scheduled to followup with electrophysiologist Dr Burch. \par \par Patient admitted  [facility] diploplia r/o TIA, NSVT, normal LVEF, asymptomatic AF on Eliquis, cardizem. Cardiology was consulted for brief asymptomatic NSVT, Baseline AFib controlled ventricular rate.   2-D echo was repeated with normal LVEF 60%.  No change in medications\par \par Patient had asymptomatic recurrence of A. fib and successful elective DC cardioversion [facility] 8/29/17. EKG remains sinus rhythm first degree AV block LAD, RBBB, age undetermined IMI and AMI\par \par Patient had coughing and wheezing bronchitis and COPD exacerbation August 2017, following with pulmonologist Dr. Contreras.\par \par Pacemaker check  reveals normally functioning pacemaker adequate thresholds and battery.  Pacemaker will be checked every 3 months. \par \par Lexiscan Myoview stress test September 2020, small fixed inferior apical defect no ischemia, LVEF 42%, nondiagnostic EKG with baseline V pacing, hypotension with Lexiscan resolved with Aminophyllin\par \par Echocardiogram May 2020, LVEF 50%, diastolic dysfunction, mild to moderate MR, mild TR, normal RVSP\par \par Echocardiogram PCP 5/3/19, LVEF 65% normal RVSP moderate MR, mild TR\par \par Carotid Doppler PCP 5/3/19 mild nonobstructive plaque\par \par Lexascan Myoview stress September 2017, LVEF 50%, medium size inferior infarct without ischemia, unchanged, no anginal symptoms, baseline sinus rhythm, IVCD, LAD, old ASWMI\par \par 2-D echo [facility] 6/5/18 LVEF 60% mild MR and TR, normal PASP\par \par 2-D echo September 2017, LVEF 60%, mild diastolic dysfunction, mild MR TR, normal PASP\par \par Carotid and abdominal ultrasound September 2017, nonobstructive plaque, proximal aorta 2.7 centimeter\par

## 2022-08-10 NOTE — PHYSICAL EXAM
[Well Developed] : well developed [Well Nourished] : well nourished [No Acute Distress] : no acute distress [Normal Venous Pressure] : normal venous pressure [No Carotid Bruit] : no carotid bruit [Normal S1, S2] : normal S1, S2 [No Murmur] : no murmur [No Rub] : no rub [No Gallop] : no gallop [Clear Lung Fields] : clear lung fields [Good Air Entry] : good air entry [No Respiratory Distress] : no respiratory distress  [Soft] : abdomen soft [Non Tender] : non-tender [No Masses/organomegaly] : no masses/organomegaly [Normal Bowel Sounds] : normal bowel sounds [Normal Gait] : normal gait [No Edema] : no edema [No Cyanosis] : no cyanosis [No Clubbing] : no clubbing [No Varicosities] : no varicosities [No Rash] : no rash [No Skin Lesions] : no skin lesions [Moves all extremities] : moves all extremities [No Focal Deficits] : no focal deficits [Normal Speech] : normal speech [Alert and Oriented] : alert and oriented [Normal memory] : normal memory [General Appearance - Well Developed] : well developed [Normal Appearance] : normal appearance [Well Groomed] : well groomed [General Appearance - Well Nourished] : well nourished [No Deformities] : no deformities [General Appearance - In No Acute Distress] : no acute distress [Normal Conjunctiva] : the conjunctiva exhibited no abnormalities [Eyelids - No Xanthelasma] : the eyelids demonstrated no xanthelasmas [Normal Oral Mucosa] : normal oral mucosa [No Oral Pallor] : no oral pallor [No Oral Cyanosis] : no oral cyanosis [Normal Jugular Venous A Waves Present] : normal jugular venous A waves present [Normal Jugular Venous V Waves Present] : normal jugular venous V waves present [No Jugular Venous Solano A Waves] : no jugular venous solano A waves [Exaggerated Use Of Accessory Muscles For Inspiration] : no accessory muscle use [Respiration, Rhythm And Depth] : normal respiratory rhythm and effort [Auscultation Breath Sounds / Voice Sounds] : lungs were clear to auscultation bilaterally [Murmurs] : no murmurs present [Abdomen Soft] : soft [Abdomen Tenderness] : non-tender [Abdomen Mass (___ Cm)] : no abdominal mass palpated [Abnormal Walk] : normal gait [Gait - Sufficient For Exercise Testing] : the gait was sufficient for exercise testing [Nail Clubbing] : no clubbing of the fingernails [Cyanosis, Localized] : no localized cyanosis [Petechial Hemorrhages (___cm)] : no petechial hemorrhages [] : no ischemic changes [Oriented To Time, Place, And Person] : oriented to person, place, and time [Affect] : the affect was normal [Mood] : the mood was normal [No Anxiety] : not feeling anxious [FreeTextEntry1] : improved walking after hip replacement surgery Dec 2017

## 2022-08-23 ENCOUNTER — NON-APPOINTMENT (OUTPATIENT)
Age: 82
End: 2022-08-23

## 2022-09-02 ENCOUNTER — NON-APPOINTMENT (OUTPATIENT)
Age: 82
End: 2022-09-02

## 2022-09-02 ENCOUNTER — APPOINTMENT (OUTPATIENT)
Dept: ELECTROPHYSIOLOGY | Facility: CLINIC | Age: 82
End: 2022-09-02

## 2022-09-02 VITALS
OXYGEN SATURATION: 96 % | BODY MASS INDEX: 27.09 KG/M2 | HEIGHT: 72 IN | DIASTOLIC BLOOD PRESSURE: 66 MMHG | HEART RATE: 53 BPM | WEIGHT: 200 LBS | TEMPERATURE: 97.5 F | SYSTOLIC BLOOD PRESSURE: 130 MMHG

## 2022-09-02 PROCEDURE — 99204 OFFICE O/P NEW MOD 45 MIN: CPT

## 2022-09-02 PROCEDURE — 93000 ELECTROCARDIOGRAM COMPLETE: CPT

## 2022-09-02 RX ORDER — LISINOPRIL 10 MG/1
10 TABLET ORAL TWICE DAILY
Refills: 0 | Status: DISCONTINUED | COMMUNITY
End: 2022-09-02

## 2022-09-26 ENCOUNTER — APPOINTMENT (OUTPATIENT)
Dept: CARDIOLOGY | Facility: CLINIC | Age: 82
End: 2022-09-26

## 2022-09-26 PROCEDURE — 93244 EXT ECG>48HR<7D REV&INTERPJ: CPT

## 2022-09-28 ENCOUNTER — APPOINTMENT (OUTPATIENT)
Dept: CARDIOLOGY | Facility: CLINIC | Age: 82
End: 2022-09-28

## 2022-10-07 NOTE — HISTORY OF PRESENT ILLNESS
[FreeTextEntry1] : Patient is an 82-year-old man who is seen in evaluation for his atrial fibrillation as well as his permanent pacemaker.  Patient underwent implantation of a single-chamber permanent pacemaker at [facility] October 2018.\par \par He has been feeling well and is not aware of the A. fib.  He has a history of asthma as well as COPD and has shortness of breath related.\par Zio patch monitor from 8/10/2020 for 6 days showed atrial fibrillation\par The patient is on Eliquis 5 mg twice daily.  He is on inhalers for his asthma/COPD.  In addition the patient is on metoprolol succinate ER 50 mg daily.  He has a history of diabetes and is on metformin.  He takes diuretic.\par \par He saw Dr. Valentino on August 10, 2022.  The patient had previous erythema as well as facial swelling nausea vomiting diarrhea after meals.  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  He had previous cardiac catheterization 2017 that showed nonobstructive coronary arteries.  The patient's had prior atrial fibrillation, right bundle branch block and underwent a permanent pacemaker implantation single-chamber 2018.  He has normal ejection fraction.

## 2022-10-07 NOTE — HISTORY OF PRESENT ILLNESS
[FreeTextEntry1] : Patient is an 82-year-old man who is seen in evaluation for his atrial fibrillation as well as his permanent pacemaker.  Patient underwent implantation of a single-chamber permanent pacemaker at [facility] October 2018.\par \par He has been feeling well and is not aware of the A. fib.  He has a history of asthma as well as COPD and has shortness of breath related.\par Zio patch monitor from 8/10/2020 for 6 days showed atrial fibrillation\par The patient is on Eliquis 5 mg twice daily.  He is on inhalers for his asthma/COPD.  In addition the patient is on metoprolol succinate ER 50 mg daily.  He has a history of diabetes and is on metformin.  He takes diuretic.\par \par He saw Dr. Valentino on August 10, 2022.  The patient had previous erythema as well as facial swelling nausea vomiting diarrhea after meals.  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  He had previous cardiac catheterization 2017 that showed nonobstructive coronary arteries.  The patient's had prior atrial fibrillation, right bundle branch block and underwent a permanent pacemaker implantation single-chamber 2018.  He has normal ejection fraction.

## 2022-10-07 NOTE — DISCUSSION/SUMMARY
[FreeTextEntry1] : Persistent atrial fibrillation: Seems to be persistent since 2018.  Appears to be rate controlled.  He is not symptomatic from the A. fib.  Anticoagulated with Eliquis.  No bleeding issue.  Left atrial diameter 5.4 cm and left atrial volume index 60 cc per metered square.  The patient is not symptomatic from A. fib and AV maintained sinus rhythm given the severely dilated left atrium.  Would recommend rate control and anticoagulation.\par \par Single-chamber pacemaker: The pacemaker was interrogated and functioning normally.  It shows that he is in atrial fibrillation.  He is mostly ventricular paced.  We will follow-up left ventricular function and if evidence of decline we will consider left ventricular pacing.\par He has had NSVT on past remote monitoring but is on a beta-blocker and has preserved left ventricular function previous nonobstructive disease. He lost about 50 lbs in last year.  Recommend beta-blocker therapy. [EKG obtained to assist in diagnosis and management of assessed problem(s)] : EKG obtained to assist in diagnosis and management of assessed problem(s)

## 2022-10-18 ENCOUNTER — APPOINTMENT (OUTPATIENT)
Dept: CARDIOLOGY | Facility: CLINIC | Age: 82
End: 2022-10-18

## 2022-10-18 VITALS
HEIGHT: 74 IN | TEMPERATURE: 96.9 F | WEIGHT: 200 LBS | DIASTOLIC BLOOD PRESSURE: 84 MMHG | BODY MASS INDEX: 25.67 KG/M2 | HEART RATE: 92 BPM | OXYGEN SATURATION: 97 % | SYSTOLIC BLOOD PRESSURE: 152 MMHG

## 2022-10-18 DIAGNOSIS — R07.9 CHEST PAIN, UNSPECIFIED: ICD-10-CM

## 2022-10-18 PROCEDURE — 99215 OFFICE O/P EST HI 40 MIN: CPT

## 2022-10-18 NOTE — REASON FOR VISIT
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 Cardiovascular review of symptoms is negative for exertional chest pain,  palpitations, dizziness or syncope. No PND or orthopnea leg edema. No bleeding or black stool.\par \par Patient is walking 10 minutes without exertional chest pain. Patient has been more physically active walking after replacement surgery December 2017\par \par Multiple unresponsive episodes with facial flushing arm weakness and urinary bladder incontinence.  Patient following up with neurologist Dr. Champion, EEG recently completed, neurology recommending cardiac catheterization.\par \par Cardiac catheterization June 2022 nonobstructive coronaries\par \par Echocardiogram April 2022 LVEF 55%, apical akinesis, mild to moderate MR, mild TR, mild PAH\par \par Cardiac catheterization April 2021, LVEF 60%, nonobstructive coronaries\par \par Patient had neck and jaw pain with swelling lasting 30 minutes sent to [facility] ER 1/11/2021, normal troponin, nondiagnostic EKG with V-pacing, no further pain discharged for outpatient cardiology follow-up. \par \par Patient on nebulizer therapy for COPD.  Patient has increasing NSVT up to 30 seconds on remote monitoring, continue on lopressor 25mg twice daily, well tolerated.\par \par Patient seen by Dr Broussard 9/19/18 found to have A. fib ventricular rate 30s diltiazem reduced from 240mg to 180mg daily.  Currently remains in A. fib with slow ventricular rate.  Cardizem will be changed to 30mg twice per day with heart rate and blood pressure check prior to dose.  Patient will hold Cardizem for heart rate less than 50bpm. Holter monitor will be done today. Repeat labs ordered. Patient scheduled to followup with electrophysiologist Dr Burch. \par \par Patient admitted  [facility] diploplia r/o TIA, NSVT, normal LVEF, asymptomatic AF on Eliquis, cardizem. Cardiology was consulted for brief asymptomatic NSVT, Baseline AFib controlled ventricular rate.   2-D echo was repeated with normal LVEF 60%.  No change in medications\par \par Patient had asymptomatic recurrence of A. fib and successful elective DC cardioversion [facility] 8/29/17. EKG remains sinus rhythm first degree AV block LAD, RBBB, age undetermined IMI and AMI\par \par Patient had coughing and wheezing bronchitis and COPD exacerbation August 2017, following with pulmonologist Dr. Contreras.\par \par Pacemaker check  reveals normally functioning pacemaker adequate thresholds and battery.  Pacemaker will be checked every 3 months. \par \par Lexiscan Myoview stress test September 2020, small fixed inferior apical defect no ischemia, LVEF 42%, nondiagnostic EKG with baseline V pacing, hypotension with Lexiscan resolved with Aminophyllin\par \par Echocardiogram May 2020, LVEF 50%, diastolic dysfunction, mild to moderate MR, mild TR, normal RVSP\par \par Echocardiogram PCP 5/3/19, LVEF 65% normal RVSP moderate MR, mild TR\par \par Carotid Doppler PCP 5/3/19 mild nonobstructive plaque\par \par Lexascan Myoview stress September 2017, LVEF 50%, medium size inferior infarct without ischemia, unchanged, no anginal symptoms, baseline sinus rhythm, IVCD, LAD, old ASWMI\par \par 2-D echo [facility] 6/5/18 LVEF 60% mild MR and TR, normal PASP\par \par 2-D echo September 2017, LVEF 60%, mild diastolic dysfunction, mild MR TR, normal PASP\par \par Carotid and abdominal ultrasound September 2017, nonobstructive plaque, proximal aorta 2.7 centimeter\par

## 2022-10-18 NOTE — PHYSICAL EXAM
[Well Developed] : well developed [Well Nourished] : well nourished [No Acute Distress] : no acute distress [Normal Venous Pressure] : normal venous pressure [No Carotid Bruit] : no carotid bruit [Normal S1, S2] : normal S1, S2 [No Murmur] : no murmur [No Rub] : no rub [No Gallop] : no gallop [Clear Lung Fields] : clear lung fields [Good Air Entry] : good air entry [No Respiratory Distress] : no respiratory distress  [Soft] : abdomen soft [Non Tender] : non-tender [No Masses/organomegaly] : no masses/organomegaly [Normal Bowel Sounds] : normal bowel sounds [Normal Gait] : normal gait [No Edema] : no edema [No Cyanosis] : no cyanosis [No Clubbing] : no clubbing [No Varicosities] : no varicosities [No Rash] : no rash [No Skin Lesions] : no skin lesions [Moves all extremities] : moves all extremities [No Focal Deficits] : no focal deficits [Normal Speech] : normal speech [Alert and Oriented] : alert and oriented [Normal memory] : normal memory [General Appearance - Well Developed] : well developed [Normal Appearance] : normal appearance [Well Groomed] : well groomed [General Appearance - Well Nourished] : well nourished [No Deformities] : no deformities [General Appearance - In No Acute Distress] : no acute distress [Normal Conjunctiva] : the conjunctiva exhibited no abnormalities [Eyelids - No Xanthelasma] : the eyelids demonstrated no xanthelasmas [Normal Oral Mucosa] : normal oral mucosa [No Oral Pallor] : no oral pallor [No Oral Cyanosis] : no oral cyanosis [Normal Jugular Venous A Waves Present] : normal jugular venous A waves present [Normal Jugular Venous V Waves Present] : normal jugular venous V waves present [No Jugular Venous Solano A Waves] : no jugular venous solano A waves [Respiration, Rhythm And Depth] : normal respiratory rhythm and effort [Exaggerated Use Of Accessory Muscles For Inspiration] : no accessory muscle use [Auscultation Breath Sounds / Voice Sounds] : lungs were clear to auscultation bilaterally [Murmurs] : no murmurs present [Abdomen Soft] : soft [Abdomen Tenderness] : non-tender [Abdomen Mass (___ Cm)] : no abdominal mass palpated [Abnormal Walk] : normal gait [Gait - Sufficient For Exercise Testing] : the gait was sufficient for exercise testing [Nail Clubbing] : no clubbing of the fingernails [Cyanosis, Localized] : no localized cyanosis [Petechial Hemorrhages (___cm)] : no petechial hemorrhages [] : no ischemic changes [Oriented To Time, Place, And Person] : oriented to person, place, and time [Affect] : the affect was normal [Mood] : the mood was normal [No Anxiety] : not feeling anxious [FreeTextEntry1] : improved walking after hip replacement surgery Dec 2017

## 2022-10-18 NOTE — DISCUSSION/SUMMARY
[FreeTextEntry1] : Hosea is an 81-year-old male with medical history detailed above and active medical issues including: \par \par - Erythema and facial swelling, nausea vomiting diarrhea after meals, possible angioedema lisinopril discontinued 8/8/2022.  Patient referred to allergist Dr. Leoncio John [phone]. \par \par - Multiple unresponsive episodes with facial flushing arm weakness and urinary bladder incontinence.  Patient following up with neurologist Dr. Champion, EEG recently completed, ENT follow-up\par \par - No anginal symptoms, nonobstructive catheterization with normal LVEF April 2021 and June 2022.\par \par - Medtronic PPM implant 10/11/18 for SSS/PAF on Eliquis, Cardizem . Normal PPM check, next check 3 months.  PAF versus junctional rhythm seen on current EKG. Zio patch 1 week heart monitor started today with TEB follow-up\par \par - NSVT up to 30 seconds on remote monitoring in the past, continue lopressor 25mg twice daily, well tolerated.\par \par -  Asymptomatic PAF elective DCCV 8/29/17 and  DCCV 2013 maintaining NSR on Eliquis diltiazem \par \par ¨ COPD stable, followup with pulmonologist Dr Benoit\par \par -  Admission [facility] diplopia r/o TIA, normal head CT, ENT eval Dr Pereira possible sinus surgery\par \par ¨ HTN BP at guideline goal on Lopressor, lisinopril and Lasix\par \par ¨ Dyslipidemia on low dose Crestor 5mg every other day, history of statin myalgia, shoulder pain\par \par ¨ Hx TIA no residual defect\par \par - post op left hip JNAE Dr Uriostegui 12/8/17 [facility]\par \par ¨ migraine stable \par \par ¨ 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 Patient educated on lifestyle and diet modification with low sodium low fat diet and avoidance of excessive alcohol. Patient is aware to call with any symptoms or concerns. \par  \par Patient will be seen in cardiology follow-up 6 months same-day echo and Doppler studies. Current cardiac medications remain unchanged. Repeat labs will be ordered with PMD.\par \par Hosea will follow up with Dr. Broussard for primary care.\par \par \par

## 2022-10-18 NOTE — REVIEW OF SYSTEMS
[Dyspnea on exertion] : dyspnea during exertion [Cough] : cough [Dizziness] : dizziness [Negative] : Heme/Lymph [de-identified] : Multiple syncopal episodes

## 2022-12-02 ENCOUNTER — NON-APPOINTMENT (OUTPATIENT)
Age: 82
End: 2022-12-02

## 2022-12-02 ENCOUNTER — APPOINTMENT (OUTPATIENT)
Dept: CARDIOLOGY | Facility: CLINIC | Age: 82
End: 2022-12-02

## 2022-12-02 PROCEDURE — 93296 REM INTERROG EVL PM/IDS: CPT

## 2022-12-02 PROCEDURE — 93294 REM INTERROG EVL PM/LDLS PM: CPT

## 2022-12-23 ENCOUNTER — NON-APPOINTMENT (OUTPATIENT)
Age: 82
End: 2022-12-23

## 2022-12-23 ENCOUNTER — APPOINTMENT (OUTPATIENT)
Dept: OPHTHALMOLOGY | Facility: CLINIC | Age: 82
End: 2022-12-23
Payer: MEDICARE

## 2022-12-23 PROCEDURE — 92004 COMPRE OPH EXAM NEW PT 1/>: CPT

## 2022-12-23 PROCEDURE — 92014 COMPRE OPH EXAM EST PT 1/>: CPT

## 2023-01-27 ENCOUNTER — NON-APPOINTMENT (OUTPATIENT)
Age: 83
End: 2023-01-27

## 2023-01-27 ENCOUNTER — APPOINTMENT (OUTPATIENT)
Dept: OPHTHALMOLOGY | Facility: CLINIC | Age: 83
End: 2023-01-27
Payer: MEDICARE

## 2023-01-27 PROCEDURE — 92015 DETERMINE REFRACTIVE STATE: CPT

## 2023-01-27 PROCEDURE — 92060 SENSORIMOTOR EXAMINATION: CPT

## 2023-03-03 ENCOUNTER — NON-APPOINTMENT (OUTPATIENT)
Age: 83
End: 2023-03-03

## 2023-03-03 ENCOUNTER — APPOINTMENT (OUTPATIENT)
Dept: CARDIOLOGY | Facility: CLINIC | Age: 83
End: 2023-03-03
Payer: MEDICARE

## 2023-03-03 PROCEDURE — 93294 REM INTERROG EVL PM/LDLS PM: CPT

## 2023-03-03 PROCEDURE — 93296 REM INTERROG EVL PM/IDS: CPT

## 2023-03-06 ENCOUNTER — APPOINTMENT (OUTPATIENT)
Dept: OPHTHALMOLOGY | Facility: CLINIC | Age: 83
End: 2023-03-06
Payer: MEDICARE

## 2023-03-06 ENCOUNTER — NON-APPOINTMENT (OUTPATIENT)
Age: 83
End: 2023-03-06

## 2023-03-06 PROCEDURE — 99213 OFFICE O/P EST LOW 20 MIN: CPT

## 2023-03-06 RX ORDER — APIXABAN 5 MG/1
5 TABLET, FILM COATED ORAL
Qty: 180 | Refills: 2 | Status: ACTIVE | COMMUNITY
Start: 2017-11-10 | End: 1900-01-01

## 2023-04-06 PROBLEM — G45.9 TRANSIENT CEREBRAL ISCHEMIA, UNSPECIFIED TYPE: Status: ACTIVE | Noted: 2017-11-29

## 2023-04-06 PROBLEM — E66.3 OVERWEIGHT: Status: ACTIVE | Noted: 2017-11-29

## 2023-04-06 PROBLEM — R55 SYNCOPE AND COLLAPSE: Status: ACTIVE | Noted: 2017-11-29

## 2023-04-10 ENCOUNTER — APPOINTMENT (OUTPATIENT)
Dept: CARDIOLOGY | Facility: CLINIC | Age: 83
End: 2023-04-10
Payer: MEDICARE

## 2023-04-10 PROCEDURE — 93979 VASCULAR STUDY: CPT

## 2023-04-10 PROCEDURE — 93306 TTE W/DOPPLER COMPLETE: CPT

## 2023-04-10 PROCEDURE — 93880 EXTRACRANIAL BILAT STUDY: CPT

## 2023-04-13 ENCOUNTER — APPOINTMENT (OUTPATIENT)
Dept: CARDIOLOGY | Facility: CLINIC | Age: 83
End: 2023-04-13
Payer: MEDICARE

## 2023-04-13 VITALS
HEART RATE: 87 BPM | OXYGEN SATURATION: 95 % | WEIGHT: 200 LBS | HEIGHT: 74 IN | DIASTOLIC BLOOD PRESSURE: 62 MMHG | SYSTOLIC BLOOD PRESSURE: 136 MMHG | BODY MASS INDEX: 25.67 KG/M2

## 2023-04-13 DIAGNOSIS — E66.3 OVERWEIGHT: ICD-10-CM

## 2023-04-13 DIAGNOSIS — G45.9 TRANSIENT CEREBRAL ISCHEMIC ATTACK, UNSPECIFIED: ICD-10-CM

## 2023-04-13 DIAGNOSIS — R55 SYNCOPE AND COLLAPSE: ICD-10-CM

## 2023-04-13 PROCEDURE — 99215 OFFICE O/P EST HI 40 MIN: CPT

## 2023-04-13 NOTE — REASON FOR VISIT
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 Cardiovascular review of symptoms is negative for exertional chest pain,  palpitations, dizziness or syncope. No PND or orthopnea leg edema. No bleeding or black stool.\par \par Patient is walking 10 minutes without exertional chest pain. Patient has been more physically active walking after replacement surgery December 2017\par \par Multiple unresponsive episodes with facial flushing arm weakness and urinary bladder incontinence.  Patient following up with neurologist Dr. Champion, EEG recently completed, neurology recommending cardiac catheterization.\par \par Echocardiogram April 2023 LVEF 53%, moderate MR, mild PH\par \par Carotid and abdominal ultrasound April 2023, mild nonobstructive plaque, normal abdominal aortic size. \par \par Cardiac catheterization June 2022 nonobstructive coronaries\par \par Echocardiogram April 2022 LVEF 55%, apical akinesis, mild to moderate MR, mild TR, mild PAH\par \par Cardiac catheterization April 2021, LVEF 60%, nonobstructive coronaries\par \par Patient had neck and jaw pain with swelling lasting 30 minutes sent to [facility] 1/11/2021, normal troponin, nondiagnostic EKG with V-pacing, no further pain discharged for outpatient cardiology follow-up. \par \par Patient on nebulizer therapy for COPD.  Patient has increasing NSVT up to 30 seconds on remote monitoring, continue on lopressor 25mg twice daily, well tolerated.\par \par Patient seen by Dr Broussard 9/19/18 found to have A. fib ventricular rate 30s diltiazem reduced from 240mg to 180mg daily.  Currently remains in A. fib with slow ventricular rate.  Cardizem will be changed to 30mg twice per day with heart rate and blood pressure check prior to dose.  Patient will hold Cardizem for heart rate less than 50bpm. Holter monitor will be done today. Repeat labs ordered. Patient scheduled to followup with electrophysiologist Dr Burch. \par \par Patient admitted  [facility] diploplia r/o TIA, NSVT, normal LVEF, asymptomatic AF on Eliquis, cardizem. Cardiology was consulted for brief asymptomatic NSVT, Baseline AFib controlled ventricular rate.   2-D echo was repeated with normal LVEF 60%.  No change in medications\par \par Patient had asymptomatic recurrence of A. fib and successful elective DC cardioversion [facility] 8/29/17. EKG remains sinus rhythm first degree AV block LAD, RBBB, age undetermined IMI and AMI\par \par Patient had coughing and wheezing bronchitis and COPD exacerbation August 2017, following with pulmonologist Dr. Contreras.\par \par Pacemaker check  reveals normally functioning pacemaker adequate thresholds and battery.  Pacemaker will be checked every 3 months. \par \par Lexiscan Myoview stress test September 2020, small fixed inferior apical defect no ischemia, LVEF 42%, nondiagnostic EKG with baseline V pacing, hypotension with Lexiscan resolved with Aminophyllin\par \par Echocardiogram May 2020, LVEF 50%, diastolic dysfunction, mild to moderate MR, mild TR, normal RVSP\par \par Echocardiogram PCP 5/3/19, LVEF 65% normal RVSP moderate MR, mild TR\par \par Carotid Doppler PCP 5/3/19 mild nonobstructive plaque\par \par Lexascan Myoview stress September 2017, LVEF 50%, medium size inferior infarct without ischemia, unchanged, no anginal symptoms, baseline sinus rhythm, IVCD, LAD, old ASWMI\par \par 2-D echo [facility] 6/5/18 LVEF 60% mild MR and TR, normal PASP\par \par 2-D echo September 2017, LVEF 60%, mild diastolic dysfunction, mild MR TR, normal PASP\par \par Carotid and abdominal ultrasound September 2017, nonobstructive plaque, proximal aorta 2.7 centimeter\par

## 2023-04-13 NOTE — DISCUSSION/SUMMARY
[FreeTextEntry1] : Hosea is an 83-year-old male with medical history detailed above and active medical issues including: \par \par - Erythema and facial swelling, nausea vomiting diarrhea after meals, possible angioedema lisinopril discontinued 8/8/2022.  Patient referred to allergist Dr. Leoncio John [phone]. \par \par - Multiple unresponsive episodes with facial flushing arm weakness and urinary bladder incontinence.  Patient following up with neurologist Dr. Champion, EEG recently completed, ENT follow-up\par \par - No anginal symptoms, nonobstructive catheterization with normal LVEF April 2021 and June 2022.\par \par - Medtronic PPM implant 10/11/18 for SSS/PAF on Eliquis, Cardizem . Normal PPM check, next check 3 months.  PAF versus junctional rhythm seen on current EKG. Zio patch 1 week heart monitor started today with TEB follow-up\par \par - NSVT up to 30 seconds on remote monitoring in the past, continue lopressor 25mg twice daily, well tolerated.\par \par -  Asymptomatic PAF elective DCCV 8/29/17 and  DCCV 2013 maintaining NSR on Eliquis diltiazem \par \par ¨ COPD stable, followup with pulmonologist Dr Benoit\par \par -  Admission [facility] diplopia r/o TIA, normal head CT, ENT eval Dr Pereira possible sinus surgery\par \par ¨ HTN BP at guideline goal on Lopressor, lisinopril and Lasix\par \par ¨ Dyslipidemia on low dose Crestor 5mg every other day, history of statin myalgia, shoulder pain\par \par ¨ Hx TIA no residual defect\par \par - post op left hip JANE Dr Uriostegui 12/8/17 [facility]\par \par ¨ migraine stable \par \par ¨ 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 Patient educated on lifestyle and diet modification with low sodium low fat diet and avoidance of excessive alcohol. Patient is aware to call with any symptoms or concerns. \par  \par Patient will be seen in cardiology follow-up 6 months. Current cardiac medications remain unchanged. Repeat labs will be ordered with PMD.\par \par Hosea will follow up with Dr. Broussard for primary care.\par \par \par

## 2023-04-13 NOTE — PHYSICAL EXAM
[Well Developed] : well developed [Well Nourished] : well nourished [No Acute Distress] : no acute distress [Normal Venous Pressure] : normal venous pressure [No Carotid Bruit] : no carotid bruit [Normal S1, S2] : normal S1, S2 [No Murmur] : no murmur [No Rub] : no rub [No Gallop] : no gallop [Clear Lung Fields] : clear lung fields [Good Air Entry] : good air entry [No Respiratory Distress] : no respiratory distress  [Soft] : abdomen soft [Non Tender] : non-tender [No Masses/organomegaly] : no masses/organomegaly [Normal Bowel Sounds] : normal bowel sounds [Normal Gait] : normal gait [No Edema] : no edema [No Cyanosis] : no cyanosis [No Clubbing] : no clubbing [No Varicosities] : no varicosities [No Rash] : no rash [No Skin Lesions] : no skin lesions [Moves all extremities] : moves all extremities [No Focal Deficits] : no focal deficits [Normal Speech] : normal speech [Alert and Oriented] : alert and oriented [Normal memory] : normal memory [General Appearance - Well Developed] : well developed [Normal Appearance] : normal appearance [Well Groomed] : well groomed [General Appearance - Well Nourished] : well nourished [No Deformities] : no deformities [General Appearance - In No Acute Distress] : no acute distress [Normal Conjunctiva] : the conjunctiva exhibited no abnormalities [Eyelids - No Xanthelasma] : the eyelids demonstrated no xanthelasmas [Normal Oral Mucosa] : normal oral mucosa [No Oral Pallor] : no oral pallor [No Oral Cyanosis] : no oral cyanosis [Normal Jugular Venous A Waves Present] : normal jugular venous A waves present [Normal Jugular Venous V Waves Present] : normal jugular venous V waves present [No Jugular Venous Solano A Waves] : no jugular venous solano A waves [Respiration, Rhythm And Depth] : normal respiratory rhythm and effort [Exaggerated Use Of Accessory Muscles For Inspiration] : no accessory muscle use [Auscultation Breath Sounds / Voice Sounds] : lungs were clear to auscultation bilaterally [Murmurs] : no murmurs present [Abdomen Soft] : soft [Abdomen Tenderness] : non-tender [Abdomen Mass (___ Cm)] : no abdominal mass palpated [Abnormal Walk] : normal gait [Gait - Sufficient For Exercise Testing] : the gait was sufficient for exercise testing [Nail Clubbing] : no clubbing of the fingernails [Cyanosis, Localized] : no localized cyanosis [] : no ischemic changes [Petechial Hemorrhages (___cm)] : no petechial hemorrhages [Oriented To Time, Place, And Person] : oriented to person, place, and time [Affect] : the affect was normal [Mood] : the mood was normal [No Anxiety] : not feeling anxious [FreeTextEntry1] : improved walking after hip replacement surgery Dec 2017

## 2023-04-13 NOTE — REVIEW OF SYSTEMS
[Dyspnea on exertion] : dyspnea during exertion [Cough] : cough [Dizziness] : dizziness [Negative] : Heme/Lymph [de-identified] : Multiple syncopal episodes

## 2023-06-02 ENCOUNTER — APPOINTMENT (OUTPATIENT)
Dept: CARDIOLOGY | Facility: CLINIC | Age: 83
End: 2023-06-02
Payer: MEDICARE

## 2023-06-02 ENCOUNTER — NON-APPOINTMENT (OUTPATIENT)
Age: 83
End: 2023-06-02

## 2023-06-02 PROCEDURE — 93296 REM INTERROG EVL PM/IDS: CPT

## 2023-06-02 PROCEDURE — 93294 REM INTERROG EVL PM/LDLS PM: CPT

## 2023-09-01 ENCOUNTER — NON-APPOINTMENT (OUTPATIENT)
Age: 83
End: 2023-09-01

## 2023-09-01 ENCOUNTER — APPOINTMENT (OUTPATIENT)
Dept: CARDIOLOGY | Facility: CLINIC | Age: 83
End: 2023-09-01
Payer: MEDICARE

## 2023-09-01 PROCEDURE — 93294 REM INTERROG EVL PM/LDLS PM: CPT

## 2023-09-01 PROCEDURE — 93296 REM INTERROG EVL PM/IDS: CPT

## 2023-10-17 ENCOUNTER — APPOINTMENT (OUTPATIENT)
Dept: CARDIOLOGY | Facility: CLINIC | Age: 83
End: 2023-10-17
Payer: MEDICARE

## 2023-10-17 ENCOUNTER — NON-APPOINTMENT (OUTPATIENT)
Age: 83
End: 2023-10-17

## 2023-10-17 VITALS
SYSTOLIC BLOOD PRESSURE: 132 MMHG | DIASTOLIC BLOOD PRESSURE: 76 MMHG | BODY MASS INDEX: 23.36 KG/M2 | HEART RATE: 99 BPM | HEIGHT: 74 IN | WEIGHT: 182 LBS | OXYGEN SATURATION: 96 %

## 2023-10-17 DIAGNOSIS — E78.2 MIXED HYPERLIPIDEMIA: ICD-10-CM

## 2023-10-17 DIAGNOSIS — Z95.0 PRESENCE OF CARDIAC PACEMAKER: ICD-10-CM

## 2023-10-17 DIAGNOSIS — R55 SYNCOPE AND COLLAPSE: ICD-10-CM

## 2023-10-17 DIAGNOSIS — I49.5 SICK SINUS SYNDROME: ICD-10-CM

## 2023-10-17 DIAGNOSIS — I47.29 OTHER VENTRICULAR TACHYCARDIA: ICD-10-CM

## 2023-10-17 LAB — HBA1C MFR BLD HPLC: 5.6

## 2023-10-17 PROCEDURE — 99215 OFFICE O/P EST HI 40 MIN: CPT

## 2023-10-17 PROCEDURE — 93000 ELECTROCARDIOGRAM COMPLETE: CPT | Mod: 1L

## 2023-10-31 ENCOUNTER — APPOINTMENT (OUTPATIENT)
Dept: VASCULAR SURGERY | Facility: CLINIC | Age: 83
End: 2023-10-31
Payer: MEDICARE

## 2023-10-31 VITALS
BODY MASS INDEX: 25.48 KG/M2 | HEIGHT: 71 IN | DIASTOLIC BLOOD PRESSURE: 68 MMHG | SYSTOLIC BLOOD PRESSURE: 144 MMHG | WEIGHT: 182 LBS

## 2023-10-31 DIAGNOSIS — T14.90XA INJURY, UNSPECIFIED, INITIAL ENCOUNTER: ICD-10-CM

## 2023-10-31 DIAGNOSIS — Z91.81 HISTORY OF FALLING: ICD-10-CM

## 2023-10-31 PROCEDURE — 99213 OFFICE O/P EST LOW 20 MIN: CPT

## 2023-10-31 RX ORDER — COLLAGENASE SANTYL 250 [ARB'U]/G
250 OINTMENT TOPICAL DAILY
Qty: 6 | Refills: 0 | Status: ACTIVE | COMMUNITY
Start: 2023-10-31 | End: 1900-01-01

## 2023-11-07 ENCOUNTER — APPOINTMENT (OUTPATIENT)
Dept: VASCULAR SURGERY | Facility: CLINIC | Age: 83
End: 2023-11-07
Payer: MEDICARE

## 2023-11-07 VITALS
HEART RATE: 116 BPM | WEIGHT: 182 LBS | HEIGHT: 71 IN | DIASTOLIC BLOOD PRESSURE: 64 MMHG | SYSTOLIC BLOOD PRESSURE: 119 MMHG | BODY MASS INDEX: 25.48 KG/M2

## 2023-11-07 DIAGNOSIS — S81.812D LACERATION W/OUT FOREIGN BODY, LEFT LOWER LEG, SUBSEQUENT ENCOUNTER: ICD-10-CM

## 2023-11-07 PROCEDURE — 11042 DBRDMT SUBQ TIS 1ST 20SQCM/<: CPT

## 2023-11-27 ENCOUNTER — APPOINTMENT (OUTPATIENT)
Dept: CARDIOLOGY | Facility: CLINIC | Age: 83
End: 2023-11-27
Payer: MEDICARE

## 2023-11-27 VITALS
SYSTOLIC BLOOD PRESSURE: 120 MMHG | DIASTOLIC BLOOD PRESSURE: 62 MMHG | HEART RATE: 86 BPM | HEIGHT: 71 IN | OXYGEN SATURATION: 97 % | WEIGHT: 182 LBS | BODY MASS INDEX: 25.48 KG/M2

## 2023-11-27 DIAGNOSIS — R26.9 UNSPECIFIED ABNORMALITIES OF GAIT AND MOBILITY: ICD-10-CM

## 2023-11-27 PROCEDURE — 99215 OFFICE O/P EST HI 40 MIN: CPT

## 2023-11-27 RX ORDER — METFORMIN ER 500 MG 500 MG/1
500 TABLET ORAL
Qty: 180 | Refills: 0 | Status: DISCONTINUED | COMMUNITY
Start: 2020-10-09 | End: 2023-11-27

## 2023-12-01 ENCOUNTER — NON-APPOINTMENT (OUTPATIENT)
Age: 83
End: 2023-12-01

## 2023-12-01 ENCOUNTER — APPOINTMENT (OUTPATIENT)
Dept: CARDIOLOGY | Facility: CLINIC | Age: 83
End: 2023-12-01
Payer: MEDICARE

## 2023-12-01 PROCEDURE — 93294 REM INTERROG EVL PM/LDLS PM: CPT

## 2023-12-01 PROCEDURE — 93296 REM INTERROG EVL PM/IDS: CPT

## 2024-03-01 ENCOUNTER — APPOINTMENT (OUTPATIENT)
Dept: CARDIOLOGY | Facility: CLINIC | Age: 84
End: 2024-03-01
Payer: MEDICARE

## 2024-03-01 ENCOUNTER — NON-APPOINTMENT (OUTPATIENT)
Age: 84
End: 2024-03-01

## 2024-03-01 PROCEDURE — 93296 REM INTERROG EVL PM/IDS: CPT

## 2024-03-01 PROCEDURE — 93294 REM INTERROG EVL PM/LDLS PM: CPT

## 2024-04-08 PROBLEM — R23.2 FACIAL FLUSHING: Status: ACTIVE | Noted: 2021-03-08

## 2024-04-08 PROBLEM — R06.09 DOE (DYSPNEA ON EXERTION): Status: ACTIVE | Noted: 2021-03-25

## 2024-04-08 PROBLEM — I48.91 A-FIB: Status: ACTIVE | Noted: 2017-11-10

## 2024-04-08 PROBLEM — I25.10 ATHSCL HEART DISEASE OF NATIVE CORONARY ARTERY W/O ANG PCTRS: Status: ACTIVE | Noted: 2017-11-29

## 2024-04-08 PROBLEM — I10 ESSENTIAL (PRIMARY) HYPERTENSION: Status: ACTIVE | Noted: 2017-11-29

## 2024-04-08 PROBLEM — R00.1 BRADYCARDIA: Status: ACTIVE | Noted: 2018-09-21

## 2024-04-09 ENCOUNTER — APPOINTMENT (OUTPATIENT)
Dept: CARDIOLOGY | Facility: CLINIC | Age: 84
End: 2024-04-09

## 2024-04-15 ENCOUNTER — APPOINTMENT (OUTPATIENT)
Dept: CARDIOLOGY | Facility: CLINIC | Age: 84
End: 2024-04-15
Payer: MEDICARE

## 2024-04-15 VITALS
DIASTOLIC BLOOD PRESSURE: 68 MMHG | OXYGEN SATURATION: 97 % | WEIGHT: 178 LBS | SYSTOLIC BLOOD PRESSURE: 128 MMHG | HEART RATE: 88 BPM | BODY MASS INDEX: 24.92 KG/M2 | HEIGHT: 71 IN

## 2024-04-15 DIAGNOSIS — I25.10 ATHEROSCLEROTIC HEART DISEASE OF NATIVE CORONARY ARTERY W/OUT ANGINA PECTORIS: ICD-10-CM

## 2024-04-15 DIAGNOSIS — R00.1 BRADYCARDIA, UNSPECIFIED: ICD-10-CM

## 2024-04-15 DIAGNOSIS — R23.2 FLUSHING: ICD-10-CM

## 2024-04-15 DIAGNOSIS — I10 ESSENTIAL (PRIMARY) HYPERTENSION: ICD-10-CM

## 2024-04-15 DIAGNOSIS — I42.8 OTHER CARDIOMYOPATHIES: ICD-10-CM

## 2024-04-15 DIAGNOSIS — R06.09 OTHER FORMS OF DYSPNEA: ICD-10-CM

## 2024-04-15 DIAGNOSIS — I50.20 UNSPECIFIED SYSTOLIC (CONGESTIVE) HEART FAILURE: ICD-10-CM

## 2024-04-15 DIAGNOSIS — I48.91 UNSPECIFIED ATRIAL FIBRILLATION: ICD-10-CM

## 2024-04-15 PROCEDURE — 93978 VASCULAR STUDY: CPT

## 2024-04-15 PROCEDURE — G2211 COMPLEX E/M VISIT ADD ON: CPT

## 2024-04-15 PROCEDURE — 93306 TTE W/DOPPLER COMPLETE: CPT

## 2024-04-15 PROCEDURE — 93880 EXTRACRANIAL BILAT STUDY: CPT

## 2024-04-15 PROCEDURE — 99215 OFFICE O/P EST HI 40 MIN: CPT

## 2024-04-15 RX ORDER — FUROSEMIDE 20 MG/1
20 TABLET ORAL DAILY
Qty: 90 | Refills: 2 | Status: ACTIVE | COMMUNITY
Start: 1900-01-01 | End: 1900-01-01

## 2024-04-15 NOTE — REVIEW OF SYSTEMS
[Dyspnea on exertion] : dyspnea during exertion [Cough] : cough [Dizziness] : dizziness [Negative] : Heme/Lymph [de-identified] : Multiple syncopal episodes

## 2024-04-15 NOTE — DISCUSSION/SUMMARY
[FreeTextEntry1] : Patient has medical history detailed above and active medical issues including:  -HFpEF, reduced LVEF 40%, mild-moderate MR, normal RVSP echo April 2024, GDMT continue Toprol, intolerant to ACE with angioedema, start Jardiance 10 Mg daily, repeat echo with OV 3 months  - Follow-up after recent gait instability fall head strike CT head negative ambulation with a cane, gait physical therapy on Eliquis for AF  - No anginal symptoms, nonobstructive catheterization with normal LVEF April 2021 and June 2022.  - Erythema and facial swelling, nausea vomiting diarrhea after meals, possible angioedema lisinopril discontinued 8/8/2022.  Patient referred to allergist Dr. Leoncio John [phone].   - Multiple unresponsive episodes with facial flushing arm weakness and urinary bladder incontinence.  Patient following up with neurologist Dr. Champion, EEG recently completed, ENT follow-up  - Medtronic PPM implant 10/11/18 for SSS/PAF on Eliquis, Cardizem . Normal PPM check, next check 3 months.  PAF versus junctional rhythm seen on current EKG. Zio patch 1 week heart monitor started today with TEB follow-up  - Asymptomatic NSVT on Toprol  -  Asymptomatic PAF elective DCCV 8/29/17 and  DCCV 2013 maintaining NSR on Eliquis diltiazem   - COPD stable, followup with pulmonologist Dr Benoit  -  Admission [facility] diplopia r/o TIA, normal head CT, ENT eval Dr Pereira possible sinus surgery  - HTN BP at guideline goal on Lopressor, lisinopril and Lasix  - Dyslipidemia on low dose Crestor 5mg every other day, history of statin myalgia, shoulder pain   - Hx TIA no residual defect  - post op left hip JANE Dr Uriostegui 12/8/17 [facility]   - migraine stable    - 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 Patient educated on lifestyle and diet modification with low sodium low fat diet and avoidance of excessive alcohol. Patient is aware to call with any symptoms or concerns.    Patient will be seen in cardiology follow-up 3 months same-day echocardiogram limited for LVEF.  Current cardiac medications remain unchanged. Repeat labs will be ordered with PMD.  Hosea will follow up with Dr. Broussard for primary care.  Total time spent 45 minutes, reviewing of test results, chart information, patient discussion, physical exam and completion of chart documentation.

## 2024-04-15 NOTE — REASON FOR VISIT
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  Patient has dyspnea with moderate exertion unchanged. Cardiovascular review of symptoms is negative for exertional chest pain,  palpitations, dizziness or syncope. No PND or orthopnea leg edema. No bleeding or black stool.  Patient is walking 10 minutes without exertional chest pain. Patient has been more physically active walking after left total hip replacement surgery December 2017  Multiple unresponsive episodes with facial flushing arm weakness and urinary bladder incontinence.  Patient following up with neurologist Dr. Champion, EEG recently completed, neurology recommending cardiac catheterization.  Echocardiogram Aug 2023 LVEF 40%, mild-moderate MR, normal RVSP.  Echocardiogram April 2023 LVEF 53%, moderate MR, mild PH  Carotid and abdominal ultrasound April 2023, mild nonobstructive plaque, normal abdominal aortic size.   Cardiac catheterization June 2022 nonobstructive coronaries  Echocardiogram April 2022 LVEF 55%, apical akinesis, mild to moderate MR, mild TR, mild PAH  Cardiac catheterization April 2021, LVEF 60%, nonobstructive coronaries  Patient had neck and jaw pain with swelling lasting 30 minutes sent to [facility] ER 1/11/2021, normal troponin, nondiagnostic EKG with V-pacing, no further pain discharged for outpatient cardiology follow-up.   Patient on nebulizer therapy for COPD.  Patient has increasing NSVT up to 30 seconds on remote monitoring, continue on lopressor 25mg twice daily, well tolerated.  Patient seen by Dr Broussard 9/19/18 found to have A. fib ventricular rate 30s diltiazem reduced from 240mg to 180mg daily.  Currently remains in A. fib with slow ventricular rate.  Cardizem will be changed to 30mg twice per day with heart rate and blood pressure check prior to dose.  Patient will hold Cardizem for heart rate less than 50bpm. Holter monitor will be done today. Repeat labs ordered. Patient scheduled to followup with electrophysiologist Dr Burch.   Patient admitted  [facility] diploplia r/o TIA, NSVT, normal LVEF, asymptomatic AF on Eliquis, cardizem. Cardiology was consulted for brief asymptomatic NSVT, Baseline AFib controlled ventricular rate.   2-D echo was repeated with normal LVEF 60%.  No change in medications  Patient had asymptomatic recurrence of A. fib and successful elective DC cardioversion [facility] 8/29/17. EKG remains sinus rhythm first degree AV block LAD, RBBB, age undetermined IMI and AMI  Patient had coughing and wheezing bronchitis and COPD exacerbation August 2017, following with pulmonologist Dr. Contreras.  Pacemaker check  reveals normally functioning pacemaker adequate thresholds and battery.  Pacemaker will be checked every 3 months.   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

## 2024-04-16 ENCOUNTER — APPOINTMENT (OUTPATIENT)
Dept: UROLOGY | Facility: CLINIC | Age: 84
End: 2024-04-16
Payer: MEDICARE

## 2024-04-16 ENCOUNTER — NON-APPOINTMENT (OUTPATIENT)
Age: 84
End: 2024-04-16

## 2024-04-16 VITALS
DIASTOLIC BLOOD PRESSURE: 73 MMHG | HEIGHT: 71 IN | SYSTOLIC BLOOD PRESSURE: 167 MMHG | WEIGHT: 178 LBS | BODY MASS INDEX: 24.92 KG/M2 | HEART RATE: 86 BPM | TEMPERATURE: 98 F

## 2024-04-16 DIAGNOSIS — K40.90 UNILATERAL INGUINAL HERNIA, W/OUT OBSTRUCTION OR GANGRENE, NOT SPECIFIED AS RECURRENT: ICD-10-CM

## 2024-04-16 PROCEDURE — 99203 OFFICE O/P NEW LOW 30 MIN: CPT

## 2024-04-16 RX ORDER — CEPHALEXIN 500 MG/1
500 TABLET ORAL EVERY 8 HOURS
Qty: 15 | Refills: 0 | Status: COMPLETED | COMMUNITY
Start: 2023-10-31 | End: 2024-04-16

## 2024-04-16 RX ORDER — FINASTERIDE 5 MG/1
5 TABLET, FILM COATED ORAL DAILY
Refills: 0 | Status: COMPLETED | COMMUNITY
End: 2024-04-16

## 2024-04-16 NOTE — HISTORY OF PRESENT ILLNESS
[FreeTextEntry1] : 85yo M presents to discuss left groin pain x 1 week He states theres a lump that changes in size.  He takes care of his wife which involves lots of physical activity reports slow stream, but denies any other bothersome urinary complaints

## 2024-04-16 NOTE — PHYSICAL EXAM
[Normal Appearance] : normal appearance [Well Groomed] : well groomed [General Appearance - In No Acute Distress] : no acute distress [Edema] : no peripheral edema [Respiration, Rhythm And Depth] : normal respiratory rhythm and effort [Exaggerated Use Of Accessory Muscles For Inspiration] : no accessory muscle use [Normal Station and Gait] : the gait and station were normal for the patient's age [] : no rash [No Focal Deficits] : no focal deficits [Oriented To Time, Place, And Person] : oriented to person, place, and time [Affect] : the affect was normal [Mood] : the mood was normal [No Palpable Adenopathy] : no palpable adenopathy [de-identified] : +left inguinal hernia

## 2024-05-31 ENCOUNTER — NON-APPOINTMENT (OUTPATIENT)
Age: 84
End: 2024-05-31

## 2024-05-31 ENCOUNTER — APPOINTMENT (OUTPATIENT)
Dept: CARDIOLOGY | Facility: CLINIC | Age: 84
End: 2024-05-31
Payer: MEDICARE

## 2024-05-31 PROCEDURE — 93294 REM INTERROG EVL PM/LDLS PM: CPT

## 2024-05-31 PROCEDURE — 93296 REM INTERROG EVL PM/IDS: CPT

## 2024-07-08 ENCOUNTER — NON-APPOINTMENT (OUTPATIENT)
Age: 84
End: 2024-07-08

## 2024-07-08 ENCOUNTER — APPOINTMENT (OUTPATIENT)
Dept: OPHTHALMOLOGY | Facility: CLINIC | Age: 84
End: 2024-07-08
Payer: MEDICARE

## 2024-07-08 PROCEDURE — 92014 COMPRE OPH EXAM EST PT 1/>: CPT

## 2024-07-16 ENCOUNTER — APPOINTMENT (OUTPATIENT)
Dept: CARDIOLOGY | Facility: CLINIC | Age: 84
End: 2024-07-16
Payer: MEDICARE

## 2024-07-16 VITALS
SYSTOLIC BLOOD PRESSURE: 132 MMHG | DIASTOLIC BLOOD PRESSURE: 70 MMHG | BODY MASS INDEX: 24.64 KG/M2 | WEIGHT: 176 LBS | HEART RATE: 92 BPM | HEIGHT: 71 IN | OXYGEN SATURATION: 96 %

## 2024-07-16 DIAGNOSIS — I50.20 UNSPECIFIED SYSTOLIC (CONGESTIVE) HEART FAILURE: ICD-10-CM

## 2024-07-16 DIAGNOSIS — M19.90 UNSPECIFIED OSTEOARTHRITIS, UNSPECIFIED SITE: ICD-10-CM

## 2024-07-16 DIAGNOSIS — I25.10 ATHEROSCLEROTIC HEART DISEASE OF NATIVE CORONARY ARTERY W/OUT ANGINA PECTORIS: ICD-10-CM

## 2024-07-16 DIAGNOSIS — I10 ESSENTIAL (PRIMARY) HYPERTENSION: ICD-10-CM

## 2024-07-16 DIAGNOSIS — I48.91 UNSPECIFIED ATRIAL FIBRILLATION: ICD-10-CM

## 2024-07-16 DIAGNOSIS — R23.2 FLUSHING: ICD-10-CM

## 2024-07-16 DIAGNOSIS — R06.09 OTHER FORMS OF DYSPNEA: ICD-10-CM

## 2024-07-16 DIAGNOSIS — R00.1 BRADYCARDIA, UNSPECIFIED: ICD-10-CM

## 2024-07-16 PROCEDURE — 93306 TTE W/DOPPLER COMPLETE: CPT

## 2024-07-16 PROCEDURE — G2211 COMPLEX E/M VISIT ADD ON: CPT

## 2024-07-16 PROCEDURE — 99215 OFFICE O/P EST HI 40 MIN: CPT

## 2024-07-16 RX ORDER — SACUBITRIL AND VALSARTAN 24; 26 MG/1; MG/1
24-26 TABLET, FILM COATED ORAL TWICE DAILY
Qty: 180 | Refills: 1 | Status: DISCONTINUED | COMMUNITY
Start: 2024-07-16 | End: 2024-07-16

## 2024-07-16 RX ORDER — FINASTERIDE 5 MG/1
5 TABLET, FILM COATED ORAL DAILY
Refills: 0 | Status: ACTIVE | COMMUNITY

## 2024-07-16 RX ORDER — CETIRIZINE HYDROCHLORIDE 10 MG/1
10 TABLET, COATED ORAL DAILY
Refills: 0 | Status: ACTIVE | COMMUNITY

## 2024-07-16 RX ORDER — FLUTICASONE FUROATE, UMECLIDINIUM BROMIDE AND VILANTEROL TRIFENATATE 100; 62.5; 25 UG/1; UG/1; UG/1
100-62.5-25 POWDER RESPIRATORY (INHALATION)
Refills: 0 | Status: ACTIVE | COMMUNITY

## 2024-07-16 RX ORDER — ROSUVASTATIN CALCIUM 5 MG/1
5 TABLET, FILM COATED ORAL DAILY
Refills: 0 | Status: ACTIVE | COMMUNITY

## 2024-07-17 RX ORDER — EMPAGLIFLOZIN 10 MG/1
10 TABLET, FILM COATED ORAL DAILY
Qty: 90 | Refills: 3 | Status: ACTIVE | COMMUNITY
Start: 2024-07-17 | End: 1900-01-01

## 2024-07-25 DIAGNOSIS — Z01.818 ENCOUNTER FOR OTHER PREPROCEDURAL EXAMINATION: ICD-10-CM

## 2024-07-29 RX ORDER — METHYLPREDNISOLONE 32 MG/1
32 TABLET ORAL
Qty: 3 | Refills: 0 | Status: ACTIVE | COMMUNITY
Start: 2024-07-25 | End: 1900-01-01

## 2024-07-29 RX ORDER — ENOXAPARIN SODIUM 80 MG/.8ML
80 INJECTION, SOLUTION SUBCUTANEOUS
Qty: 6 | Refills: 0 | Status: ACTIVE | COMMUNITY
Start: 2024-07-25 | End: 1900-01-01

## 2024-08-06 ENCOUNTER — APPOINTMENT (OUTPATIENT)
Dept: CARDIOLOGY | Facility: CLINIC | Age: 84
End: 2024-08-06

## 2024-08-06 PROBLEM — I34.0 SEVERE MITRAL REGURGITATION: Status: ACTIVE | Noted: 2024-08-06

## 2024-08-06 PROCEDURE — 93325 DOPPLER ECHO COLOR FLOW MAPG: CPT

## 2024-08-06 PROCEDURE — G2211 COMPLEX E/M VISIT ADD ON: CPT

## 2024-08-06 PROCEDURE — 93321 DOPPLER ECHO F-UP/LMTD STD: CPT

## 2024-08-06 PROCEDURE — 99215 OFFICE O/P EST HI 40 MIN: CPT

## 2024-08-06 PROCEDURE — 93351 STRESS TTE COMPLETE: CPT

## 2024-08-06 PROCEDURE — 93308 TTE F-UP OR LMTD: CPT

## 2024-08-06 NOTE — DISCUSSION/SUMMARY
[FreeTextEntry1] : Patient has medical history detailed above and active medical issues including:  - HFrEF, improved LVEF 40% prior 30% on GDMT.  Continue Toprol, intolerant to ACE with angioedema, continue Jardiance 10 Mg daily.  Nonobstructive cath Aug 2024. EP evaluation for dual-lead PPM upgrade to BiV device.   - Severe MR, reduced LVEF, CTS consult for MV clip   - Follow-up after recent gait instability fall head strike CT head negative ambulation with a cane, gait physical therapy on Eliquis for AF  - No anginal symptoms, nonobstructive catheterization with normal LVEF April 2021 and June 2022.  - Erythema and facial swelling, nausea vomiting diarrhea after meals, possible angioedema lisinopril discontinued 8/8/2022.  Patient referred to allergist Dr. Leoncio John [phone].   - Multiple unresponsive episodes with facial flushing arm weakness and urinary bladder incontinence.  Patient following up with neurologist Dr. Champion, EEG recently completed, ENT follow-up  - Medtronic PPM implant 10/11/18 for SSS/PAF on Eliquis, Cardizem . Normal PPM check, next check 3 months.   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 Patient educated on lifestyle and diet modification with low sodium low fat diet and avoidance of excessive alcohol. Patient is aware to call with any symptoms or concerns.    Patient will be seen in cardiology follow-up 1 month.  Current cardiac medications remain unchanged. Repeat labs will be ordered with PMD.  Hosea will follow up with Dr. Broussard for primary care.  Total time spent 45 minutes, reviewing of test results, chart information, patient discussion, physical exam and completion of chart documentation.

## 2024-08-06 NOTE — REASON FOR VISIT
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  Patient has dyspnea with mild exertion. Cardiovascular review of symptoms is negative for exertional chest pain, palpitations, dizziness or syncope. No PND or orthopnea leg edema. No bleeding or black stool.  No exercise routine.  Patient is walking 10 minutes on occasion.  Multiple unresponsive episodes with facial flushing arm weakness and urinary bladder incontinence.  Patient following up with neurologist Dr. Champion with EEG   Echocardiogram Aug 2024, improved LVEF 40% on GDMT  Cardiac catheterization Aug 2024 nonobstructive coronaries, [facility]  Echocardiogram July 2024 LVEF 31%, moderate to severe MR, moderate pH   Echocardiogram Aug 2023 LVEF 40%, mild-moderate MR, normal RVSP.  Echocardiogram April 2023 LVEF 53%, moderate MR, mild PH  Carotid and abdominal ultrasound April 2023, mild nonobstructive plaque, normal abdominal aortic size.   Cardiac catheterization June 2022 nonobstructive coronaries  Echocardiogram April 2022 LVEF 55%, apical akinesis, mild to moderate MR, mild TR, mild PAH  Cardiac catheterization April 2021, LVEF 60%, nonobstructive coronaries  Patient had neck and jaw pain with swelling lasting 30 minutes sent to [facility] ER 1/11/2021, normal troponin, nondiagnostic EKG with V-pacing, no further pain discharged for outpatient cardiology follow-up.   Patient on nebulizer therapy for COPD.  Patient has increasing NSVT up to 30 seconds on remote monitoring, continue on lopressor 25mg twice daily, well tolerated.  Patient seen by Dr Broussard 9/19/18 found to have A. fib ventricular rate 30s diltiazem reduced from 240mg to 180mg daily.  Currently remains in A. fib with slow ventricular rate.  Cardizem will be changed to 30mg twice per day with heart rate and blood pressure check prior to dose.  Patient will hold Cardizem for heart rate less than 50bpm. Holter monitor will be done today. Repeat labs ordered. Patient scheduled to followup with electrophysiologist Dr Burch.   Patient admitted  [facility] diploplia r/o TIA, NSVT, normal LVEF, asymptomatic AF on Eliquis, cardizem. Cardiology was consulted for brief asymptomatic NSVT, Baseline AFib controlled ventricular rate.   2-D echo was repeated with normal LVEF 60%.  No change in medications  Patient had asymptomatic recurrence of A. fib and successful elective DC cardioversion [facility] 8/29/17. EKG remains sinus rhythm first degree AV block LAD, RBBB, age undetermined IMI and AMI  Patient had coughing and wheezing bronchitis and COPD exacerbation August 2017, following with pulmonologist Dr. Contreras.  Pacemaker check  reveals normally functioning pacemaker adequate thresholds and battery.  Pacemaker will be checked every 3 months.   Lexiscan Myoview stress test September 2020, small fixed inferior apical defect no ischemia, LVEF 42%, nondiagnostic EKG with baseline V pacing, hypotension with Lexiscan resolved with Aminophyllin  Echocardiogram May 2020, LVEF 50%, diastolic dysfunction, mild to moderate MR, mild TR, normal RVSP  Echocardiogram PCP 5/3/19, LVEF 65% normal RVSP moderate MR, mild TR  Carotid Doppler PCP 5/3/19 mild nonobstructive plaque  Lexascan Myoview stress September 2017, LVEF 50%, medium size inferior infarct without ischemia, unchanged, no anginal symptoms, baseline sinus rhythm, IVCD, LAD, old ASWMI  2-D echo [facility] 6/5/18 LVEF 60% mild MR and TR, normal PASP  2-D echo September 2017, LVEF 60%, mild diastolic dysfunction, mild MR TR, normal PASP  Carotid and abdominal ultrasound September 2017, nonobstructive plaque, proximal aorta 2.7 centimeter

## 2024-08-06 NOTE — REVIEW OF SYSTEMS
[Dyspnea on exertion] : dyspnea during exertion [Cough] : cough [Dizziness] : dizziness [Negative] : Heme/Lymph [de-identified] : Multiple syncopal episodes

## 2024-08-06 NOTE — REASON FOR VISIT
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  Patient has dyspnea with mild exertion. Cardiovascular review of symptoms is negative for exertional chest pain, palpitations, dizziness or syncope. No PND or orthopnea leg edema. No bleeding or black stool.  No exercise routine.  Patient is walking 10 minutes on occasion.  Multiple unresponsive episodes with facial flushing arm weakness and urinary bladder incontinence.  Patient following up with neurologist Dr. Champion with EEG   Echocardiogram Aug 2024, improved LVEF 40% on GDMT  Cardiac catheterization Aug 2024 nonobstructive coronaries, [facility]  Echocardiogram July 2024 LVEF 31%, moderate to severe MR, moderate pH   Echocardiogram Aug 2023 LVEF 40%, mild-moderate MR, normal RVSP.  Echocardiogram April 2023 LVEF 53%, moderate MR, mild PH  Carotid and abdominal ultrasound April 2023, mild nonobstructive plaque, normal abdominal aortic size.   Cardiac catheterization June 2022 nonobstructive coronaries  Echocardiogram April 2022 LVEF 55%, apical akinesis, mild to moderate MR, mild TR, mild PAH  Cardiac catheterization April 2021, LVEF 60%, nonobstructive coronaries  Patient had neck and jaw pain with swelling lasting 30 minutes sent to [facility] ER 1/11/2021, normal troponin, nondiagnostic EKG with V-pacing, no further pain discharged for outpatient cardiology follow-up.   Patient on nebulizer therapy for COPD.  Patient has increasing NSVT up to 30 seconds on remote monitoring, continue on lopressor 25mg twice daily, well tolerated.  Patient seen by Dr Broussard 9/19/18 found to have A. fib ventricular rate 30s diltiazem reduced from 240mg to 180mg daily.  Currently remains in A. fib with slow ventricular rate.  Cardizem will be changed to 30mg twice per day with heart rate and blood pressure check prior to dose.  Patient will hold Cardizem for heart rate less than 50bpm. Holter monitor will be done today. Repeat labs ordered. Patient scheduled to followup with electrophysiologist Dr Burch.   Patient admitted  [facility] diploplia r/o TIA, NSVT, normal LVEF, asymptomatic AF on Eliquis, cardizem. Cardiology was consulted for brief asymptomatic NSVT, Baseline AFib controlled ventricular rate.   2-D echo was repeated with normal LVEF 60%.  No change in medications  Patient had asymptomatic recurrence of A. fib and successful elective DC cardioversion [facility] 8/29/17. EKG remains sinus rhythm first degree AV block LAD, RBBB, age undetermined IMI and AMI  Patient had coughing and wheezing bronchitis and COPD exacerbation August 2017, following with pulmonologist Dr. Contreras.  Pacemaker check  reveals normally functioning pacemaker adequate thresholds and battery.  Pacemaker will be checked every 3 months.   Lexiscan Myoview stress test September 2020, small fixed inferior apical defect no ischemia, LVEF 42%, nondiagnostic EKG with baseline V pacing, hypotension with Lexiscan resolved with Aminophyllin  Echocardiogram May 2020, LVEF 50%, diastolic dysfunction, mild to moderate MR, mild TR, normal RVSP  Echocardiogram PCP 5/3/19, LVEF 65% normal RVSP moderate MR, mild TR  Carotid Doppler PCP 5/3/19 mild nonobstructive plaque  Lexascan Myoview stress September 2017, LVEF 50%, medium size inferior infarct without ischemia, unchanged, no anginal symptoms, baseline sinus rhythm, IVCD, LAD, old ASWMI  2-D echo [facility] 6/5/18 LVEF 60% mild MR and TR, normal PASP  2-D echo September 2017, LVEF 60%, mild diastolic dysfunction, mild MR TR, normal PASP  Carotid and abdominal ultrasound September 2017, nonobstructive plaque, proximal aorta 2.7 centimeter

## 2024-08-06 NOTE — REASON FOR VISIT
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  Patient has dyspnea with mild exertion. Cardiovascular review of symptoms is negative for exertional chest pain, palpitations, dizziness or syncope. No PND or orthopnea leg edema. No bleeding or black stool.  No exercise routine.  Patient is walking 10 minutes on occasion.  Multiple unresponsive episodes with facial flushing arm weakness and urinary bladder incontinence.  Patient following up with neurologist Dr. Champion with EEG   Echocardiogram Aug 2024, improved LVEF 40% on GDMT  Cardiac catheterization Aug 2024 nonobstructive coronaries, [facility]  Echocardiogram July 2024 LVEF 31%, moderate to severe MR, moderate pH   Echocardiogram Aug 2023 LVEF 40%, mild-moderate MR, normal RVSP.  Echocardiogram April 2023 LVEF 53%, moderate MR, mild PH  Carotid and abdominal ultrasound April 2023, mild nonobstructive plaque, normal abdominal aortic size.   Cardiac catheterization June 2022 nonobstructive coronaries  Echocardiogram April 2022 LVEF 55%, apical akinesis, mild to moderate MR, mild TR, mild PAH  Cardiac catheterization April 2021, LVEF 60%, nonobstructive coronaries  Patient had neck and jaw pain with swelling lasting 30 minutes sent to [facility] ER 1/11/2021, normal troponin, nondiagnostic EKG with V-pacing, no further pain discharged for outpatient cardiology follow-up.   Patient on nebulizer therapy for COPD.  Patient has increasing NSVT up to 30 seconds on remote monitoring, continue on lopressor 25mg twice daily, well tolerated.  Patient seen by Dr Broussard 9/19/18 found to have A. fib ventricular rate 30s diltiazem reduced from 240mg to 180mg daily.  Currently remains in A. fib with slow ventricular rate.  Cardizem will be changed to 30mg twice per day with heart rate and blood pressure check prior to dose.  Patient will hold Cardizem for heart rate less than 50bpm. Holter monitor will be done today. Repeat labs ordered. Patient scheduled to followup with electrophysiologist Dr Burch.   Patient admitted  [facility] diploplia r/o TIA, NSVT, normal LVEF, asymptomatic AF on Eliquis, cardizem. Cardiology was consulted for brief asymptomatic NSVT, Baseline AFib controlled ventricular rate.   2-D echo was repeated with normal LVEF 60%.  No change in medications  Patient had asymptomatic recurrence of A. fib and successful elective DC cardioversion [facility] 8/29/17. EKG remains sinus rhythm first degree AV block LAD, RBBB, age undetermined IMI and AMI  Patient had coughing and wheezing bronchitis and COPD exacerbation August 2017, following with pulmonologist Dr. Contreras.  Pacemaker check  reveals normally functioning pacemaker adequate thresholds and battery.  Pacemaker will be checked every 3 months.   Lexiscan Myoview stress test September 2020, small fixed inferior apical defect no ischemia, LVEF 42%, nondiagnostic EKG with baseline V pacing, hypotension with Lexiscan resolved with Aminophyllin  Echocardiogram May 2020, LVEF 50%, diastolic dysfunction, mild to moderate MR, mild TR, normal RVSP  Echocardiogram PCP 5/3/19, LVEF 65% normal RVSP moderate MR, mild TR  Carotid Doppler PCP 5/3/19 mild nonobstructive plaque  Lexascan Myoview stress September 2017, LVEF 50%, medium size inferior infarct without ischemia, unchanged, no anginal symptoms, baseline sinus rhythm, IVCD, LAD, old ASWMI  2-D echo [facility] 6/5/18 LVEF 60% mild MR and TR, normal PASP  2-D echo September 2017, LVEF 60%, mild diastolic dysfunction, mild MR TR, normal PASP  Carotid and abdominal ultrasound September 2017, nonobstructive plaque, proximal aorta 2.7 centimeter

## 2024-08-06 NOTE — REVIEW OF SYSTEMS
[Dyspnea on exertion] : dyspnea during exertion [Cough] : cough [Dizziness] : dizziness [Negative] : Heme/Lymph [de-identified] : Multiple syncopal episodes

## 2024-08-06 NOTE — REVIEW OF SYSTEMS
[Dyspnea on exertion] : dyspnea during exertion [Cough] : cough [Dizziness] : dizziness [Negative] : Heme/Lymph [de-identified] : Multiple syncopal episodes

## 2024-08-06 NOTE — DISCUSSION/SUMMARY
[FreeTextEntry1] : Patient has medical history detailed above and active medical issues including:  - HFrEF, improved LVEF 40% prior 30% on GDMT.  Continue Toprol, intolerant to ACE with angioedema, continue Jardiance 10 Mg daily.  Nonobstructive cath Aug 2024. EP evaluation for dual-lead PPM upgrade to BiV device.   - Severe MR, reduced LVEF, CTS consult for MV clip   - Follow-up after recent gait instability fall head strike CT head negative ambulation with a cane, gait physical therapy on Eliquis for AF  - No anginal symptoms, nonobstructive catheterization with normal LVEF April 2021 and June 2022.  - Erythema and facial swelling, nausea vomiting diarrhea after meals, possible angioedema lisinopril discontinued 8/8/2022.  Patient referred to allergist Dr. Leoncio John [phone].   - Multiple unresponsive episodes with facial flushing arm weakness and urinary bladder incontinence.  Patient following up with neurologist Dr. Champion, EEG recently completed, ENT follow-up  - Medtronic PPM implant 10/11/18 for SSS/PAF on Eliquis, Cardizem . Normal PPM check, next check 3 months.   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 Patient educated on lifestyle and diet modification with low sodium low fat diet and avoidance of excessive alcohol. Patient is aware to call with any symptoms or concerns.    Patient will be seen in cardiology follow-up 1 month.  Current cardiac medications remain unchanged. Repeat labs will be ordered with PMD.  Hosea will follow up with Dr. Broussard for primary care.  Total time spent 45 minutes, reviewing of test results, chart information, patient discussion, physical exam and completion of chart documentation.

## 2024-08-06 NOTE — DISCUSSION/SUMMARY
[FreeTextEntry1] : Patient has medical history detailed above and active medical issues including:  - HFrEF, improved LVEF 40% prior 30% on GDMT.  Continue Toprol, intolerant to ACE with angioedema, continue Jardiance 10 Mg daily.  Nonobstructive cath Aug 2024. EP evaluation for dual-lead PPM upgrade to BiV device.   - Severe MR, reduced LVEF, CTS consult for MV clip   - Follow-up after recent gait instability fall head strike CT head negative ambulation with a cane, gait physical therapy on Eliquis for AF  - No anginal symptoms, nonobstructive catheterization with normal LVEF April 2021 and June 2022.  - Erythema and facial swelling, nausea vomiting diarrhea after meals, possible angioedema lisinopril discontinued 8/8/2022.  Patient referred to allergist Dr. Leoncio John [phone].   - Multiple unresponsive episodes with facial flushing arm weakness and urinary bladder incontinence.  Patient following up with neurologist Dr. Champion, EEG recently completed, ENT follow-up  - Medtronic PPM implant 10/11/18 for SSS/PAF on Eliquis, Cardizem . Normal PPM check, next check 3 months.   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 Patient educated on lifestyle and diet modification with low sodium low fat diet and avoidance of excessive alcohol. Patient is aware to call with any symptoms or concerns.    Patient will be seen in cardiology follow-up 1 month.  Current cardiac medications remain unchanged. Repeat labs will be ordered with PMD.  Hosea will follow up with Dr. Broussard for primary care.  Total time spent 45 minutes, reviewing of test results, chart information, patient discussion, physical exam and completion of chart documentation.

## 2024-08-26 ENCOUNTER — APPOINTMENT (OUTPATIENT)
Dept: CARDIOTHORACIC SURGERY | Facility: CLINIC | Age: 84
End: 2024-08-26
Payer: MEDICARE

## 2024-08-26 VITALS
DIASTOLIC BLOOD PRESSURE: 56 MMHG | RESPIRATION RATE: 14 BRPM | BODY MASS INDEX: 24.92 KG/M2 | OXYGEN SATURATION: 98 % | HEIGHT: 71 IN | WEIGHT: 178 LBS | HEART RATE: 64 BPM | SYSTOLIC BLOOD PRESSURE: 120 MMHG

## 2024-08-26 DIAGNOSIS — I48.91 UNSPECIFIED ATRIAL FIBRILLATION: ICD-10-CM

## 2024-08-26 DIAGNOSIS — I34.0 NONRHEUMATIC MITRAL (VALVE) INSUFFICIENCY: ICD-10-CM

## 2024-08-26 PROCEDURE — 99204 OFFICE O/P NEW MOD 45 MIN: CPT

## 2024-08-27 NOTE — DATA REVIEWED
[FreeTextEntry1] : Limited Transthoracic Echocardiogram performed through [facility] 8/6/24: FINDINGS:  Left Ventricle: The left ventricular cavity is normal in size. Left ventricular systolic function is mildly decreased with a calculated ejection fraction of 48 % by the Gonzalez's biplane method of disks with an ejection fraction visually estimated at 40%. There is global left ventricular hypokinesis. The left ventricular diastolic function is indeterminate. Analysis of left ventricular diastolic function and filling pressure is made challenging by the presence of atrial fibrillation. Mild left ventricular hypertrophy.  Right Ventricle: A device lead is visualized in the right heart.  Left Atrium: The left atrium is severely dilated with an indexed volume of 49.58 ml/m.  Pericardium: No pericardial effusion seen. The pericardium is normal.     Cardiac Catherization performed at [facility] on 8/1/24: Diagnostic Conclusions: Mild, non-obstructive irregularities. 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 Continue NICM workup - EP for BiV? DAMON for MR?      Transthoracic Echocardiogram performed through [facility] 7/16/24: FINDINGS:  Left Ventricle: The left ventricular cavity is normal in size. Left ventricular systolic function is moderately to severely decreased with a calculated ejection fraction of 31 % by the Gonzalez's biplane method of disks. There is global left ventricular hypokinesis. The left ventricular diastolic function is indeterminate. Analysis of left ventricular diastolic function and filling pressure is made challenging by the presence of atrial fibrillation. Moderate left ventricular hypertrophy.  Right Ventricle: The right ventricular cavity is mildly enlarged in size and right ventricular systolic function is borderline reduced. Tricuspid annular tissue Doppler S' is 10.0 cm/s (normal >10 cm/s). A device lead is visualized.  Left Atrium: The left atrium is severely dilated with an indexed volume of 67.92 ml/m.  Right Atrium: The right atrium is moderately dilated with an indexed volume of 40.86 ml/m and an indexed area of 12.63 cm/m.  Interatrial Septum: The interatrial septum appears intact.  Aortic Valve: The aortic valve appears trileaflet. There is fibrocalcific aortic valve sclerosis without stenosis. The peak transaortic velocity is 1.32 m/s and peak transaortic gradient is 6.9 mmHg. There is no evidence of aortic regurgitation.  Mitral Valve: There is calcification of the mitral valve annulus. Thickened mitral valve leaflets. There is no mitral valve stenosis. There is moderate to severe mitral regurgitation.  Tricuspid Valve: Structurally normal tricuspid valve with normal leaflet excursion. There is no evidence of tricuspid stenosis. There is mild tricuspid regurgitation. Estimated pulmonary artery systolic pressure is 55 mmHg, consistent with moderate pulmonary hypertension.  Pulmonic Valve: Structurally normal pulmonic valve with normal leaflet excursion. There is no pulmonic valve stenosis. There is mild pulmonic regurgitation.  Aorta: The segments of the aorta that are visualized, appears normal in size.  Pericardium: No pericardial effusion seen. The pericardium is normal.  Systemic Veins: The inferior vena cava is dilated (dilated >2.1cm) with abnormal inspiratory collapse (abnormal <50%) consistent with elevated right atrial pressure ( R 15, range 10-20mmHg).

## 2024-08-27 NOTE — ASSESSMENT
[FreeTextEntry1] : The patient is a very pleasant 84-year-old gentleman with severe underlying lung disease.  He was referred with moderate to severe mitral valve regurgitation and decreased functional status.  He presents for consideration for mitral valve clip.  DAMON will be arranged at [facility].  In addition, the patient will be referred to heart failure for optimization of his goal-directed medical therapy.  The patient will then follow-up with Dr. Yonathan Calle for consideration for MitraClip.  I would like to thank you for referring this patient to my attention and for allowing me to participate in [his/her] care.  If there are any further questions, or I can be of any further assistance, please do not hesitate contacting me at any time.

## 2024-08-27 NOTE — HISTORY OF PRESENT ILLNESS
[FreeTextEntry1] : Mr. GOKUL MERRITT is an 84-year-old male, referred by Dr. Valentino, who presents for consultation regarding surgical candidacy for a mitral clip.   Past medical history includes hypertension, dyslipidemia, TIA, COPD, migraine, allergy to aspirin with anaphylaxis, Lisinopril angioedema, left hip JANE Dr Uriostegui [facility] 12/8/17, nonobstructive cath 2007, small inferior and apical MI Myoview stress 5/15, RBBB, asymptomatic [place] CHADS score is 6 on Eliquis, successful DC cardioversion in May 2013, Medtronic PPM implant 10/11/18 for SSS/PAF, NSVT normal LVEF.  Mr. Merritt recently had echocardiogram imaging which noted moderate to severe mitral regurgitation.  The patient reports a history of severe underlying COPD.  He has not on home oxygen.  He reports being a former heavy smoker and stated that he used to smoke 3 cigarettes before getting out of bed.  He has a known history of LV dysfunction, atrial fibrillation, and mitral valve regurgitation.  He reports that his dominant activity is playing golf.  Although he does not report worsening of his shortness of breath, he reports that he is unable to golf as frequently as he would like.  I have discussed the case directly with Dr. Valentino who feels that the patient's mitral valve regurgitation is more symptomatic than the patient is letting on.  Echocardiogram performed August 6, 2024 was personally reviewed.  I agree with the assessment that there is moderate to severe mitral valve regurgitation.  Cardiac catheterization was also performed on August 1, 2024 and the study was also personally reviewed.  There are minor coronary luminal irregularities.  The patient's past medical history, past surgical history, family history, social history, allergies, medications, and multisystem review of systems were individually reviewed with the patient.  There are no pertinent additions or subtractions.  The patient was personally seen and examined.

## 2024-08-27 NOTE — ASSESSMENT
[FreeTextEntry1] : The patient is a very pleasant 84-year-old gentleman with severe underlying lung disease.  He was referred with moderate to severe mitral valve regurgitation and decreased functional status.  He presents for consideration for mitral valve clip.  DAMON will be arranged at [facility].  In addition, the patient will be referred to heart failure for optimization of his goal-directed medical therapy.  The patient will then follow-up with Dr. Yonathan Calle for consideration for MitraClip.  I would like to thank you for referring this patient to my attention and for allowing me to participate in [his/her] care.  If there are any further questions, or I can be of any further assistance, please do not hesitate contacting me at any time.

## 2024-08-27 NOTE — PHYSICAL EXAM
[General Appearance - Alert] : alert [General Appearance - In No Acute Distress] : in no acute distress [Sclera] : the sclera and conjunctiva were normal [PERRL With Normal Accommodation] : pupils were equal in size, round, and reactive to light [Extraocular Movements] : extraocular movements were intact [Outer Ear] : the ears and nose were normal in appearance [Oropharynx] : the oropharynx was normal [Neck Appearance] : the appearance of the neck was normal [Neck Cervical Mass (___cm)] : no neck mass was observed [Thyroid Diffuse Enlargement] : the thyroid was not enlarged [Jugular Venous Distention Increased] : there was no jugular-venous distention [Thyroid Nodule] : there were no palpable thyroid nodules [Auscultation Breath Sounds / Voice Sounds] : lungs were clear to auscultation bilaterally [Apical Impulse] : the apical impulse was normal [Heart Sounds] : normal S1 and S2 [Heart Sounds Gallop] : no gallops [Heart Sounds Pericardial Friction Rub] : no pericardial rub [FreeTextEntry1] : Irregularly irregular, 3/6 systolic murmur cardiac apex [Varicose Veins Of The Right Leg] : the patient has no varicose veins of the right leg [Varicose Veins Of The Left Leg] : the patient has no varicose veins of the left leg [Bowel Sounds] : normal bowel sounds [Abdomen Soft] : soft [Abdomen Tenderness] : non-tender [Abdomen Mass (___ Cm)] : no abdominal mass palpated [Cervical Lymph Nodes Enlarged Posterior Bilaterally] : posterior cervical [Cervical Lymph Nodes Enlarged Anterior Bilaterally] : anterior cervical [Supraclavicular Lymph Nodes Enlarged Bilaterally] : supraclavicular [Axillary Lymph Nodes Enlarged Bilaterally] : axillary [Femoral Lymph Nodes Enlarged Bilaterally] : femoral [Inguinal Lymph Nodes Enlarged Bilaterally] : inguinal [Abnormal Walk] : normal gait [Nail Clubbing] : no clubbing  or cyanosis of the fingernails [Musculoskeletal - Swelling] : no joint swelling seen [Motor Tone] : muscle strength and tone were normal [Skin Color & Pigmentation] : normal skin color and pigmentation [Skin Turgor] : normal skin turgor [] : no rash [Deep Tendon Reflexes (DTR)] : deep tendon reflexes were 2+ and symmetric [Sensation] : the sensory exam was normal to light touch and pinprick [No Focal Deficits] : no focal deficits [Oriented To Time, Place, And Person] : oriented to person, place, and time [Impaired Insight] : insight and judgment were intact [Affect] : the affect was normal

## 2024-08-27 NOTE — DATA REVIEWED
[FreeTextEntry1] : Limited Transthoracic Echocardiogram performed through [facility] 8/6/24: FINDINGS:  Left Ventricle: The left ventricular cavity is normal in size. Left ventricular systolic function is mildly decreased with a calculated ejection fraction of 48 % by the Gonzalez's biplane method of disks with an ejection fraction visually estimated at 40%. There is global left ventricular hypokinesis. The left ventricular diastolic function is indeterminate. Analysis of left ventricular diastolic function and filling pressure is made challenging by the presence of atrial fibrillation. Mild left ventricular hypertrophy.  Right Ventricle: A device lead is visualized in the right heart.  Left Atrium: The left atrium is severely dilated with an indexed volume of 49.58 ml/m.  Pericardium: No pericardial effusion seen. The pericardium is normal.     Cardiac Catherization performed at [facility] on 8/1/24: Diagnostic Conclusions: Mild, non-obstructive irregularities. 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 Continue NICM workup - EP for BiV? DAMON for MR?      Transthoracic Echocardiogram performed through [facility] 7/16/24: FINDINGS:  Left Ventricle: The left ventricular cavity is normal in size. Left ventricular systolic function is moderately to severely decreased with a calculated ejection fraction of 31 % by the Gonzalez's biplane method of disks. There is global left ventricular hypokinesis. The left ventricular diastolic function is indeterminate. Analysis of left ventricular diastolic function and filling pressure is made challenging by the presence of atrial fibrillation. Moderate left ventricular hypertrophy.  Right Ventricle: The right ventricular cavity is mildly enlarged in size and right ventricular systolic function is borderline reduced. Tricuspid annular tissue Doppler S' is 10.0 cm/s (normal >10 cm/s). A device lead is visualized.  Left Atrium: The left atrium is severely dilated with an indexed volume of 67.92 ml/m.  Right Atrium: The right atrium is moderately dilated with an indexed volume of 40.86 ml/m and an indexed area of 12.63 cm/m.  Interatrial Septum: The interatrial septum appears intact.  Aortic Valve: The aortic valve appears trileaflet. There is fibrocalcific aortic valve sclerosis without stenosis. The peak transaortic velocity is 1.32 m/s and peak transaortic gradient is 6.9 mmHg. There is no evidence of aortic regurgitation.  Mitral Valve: There is calcification of the mitral valve annulus. Thickened mitral valve leaflets. There is no mitral valve stenosis. There is moderate to severe mitral regurgitation.  Tricuspid Valve: Structurally normal tricuspid valve with normal leaflet excursion. There is no evidence of tricuspid stenosis. There is mild tricuspid regurgitation. Estimated pulmonary artery systolic pressure is 55 mmHg, consistent with moderate pulmonary hypertension.  Pulmonic Valve: Structurally normal pulmonic valve with normal leaflet excursion. There is no pulmonic valve stenosis. There is mild pulmonic regurgitation.  Aorta: The segments of the aorta that are visualized, appears normal in size.  Pericardium: No pericardial effusion seen. The pericardium is normal.  Systemic Veins: The inferior vena cava is dilated (dilated >2.1cm) with abnormal inspiratory collapse (abnormal <50%) consistent with elevated right atrial pressure ( R 15, range 10-20mmHg).

## 2024-08-27 NOTE — PHYSICAL EXAM
[General Appearance - Alert] : alert [General Appearance - In No Acute Distress] : in no acute distress [Sclera] : the sclera and conjunctiva were normal [PERRL With Normal Accommodation] : pupils were equal in size, round, and reactive to light [Extraocular Movements] : extraocular movements were intact [Outer Ear] : the ears and nose were normal in appearance [Oropharynx] : the oropharynx was normal [Neck Appearance] : the appearance of the neck was normal [Neck Cervical Mass (___cm)] : no neck mass was observed [Thyroid Diffuse Enlargement] : the thyroid was not enlarged [Jugular Venous Distention Increased] : there was no jugular-venous distention [Thyroid Nodule] : there were no palpable thyroid nodules [Auscultation Breath Sounds / Voice Sounds] : lungs were clear to auscultation bilaterally [Apical Impulse] : the apical impulse was normal [Heart Sounds] : normal S1 and S2 [Heart Sounds Gallop] : no gallops [Heart Sounds Pericardial Friction Rub] : no pericardial rub [FreeTextEntry1] : Irregularly irregular, 3/6 systolic murmur cardiac apex [Varicose Veins Of The Right Leg] : the patient has no varicose veins of the right leg [Varicose Veins Of The Left Leg] : the patient has no varicose veins of the left leg [Bowel Sounds] : normal bowel sounds [Abdomen Soft] : soft [Abdomen Tenderness] : non-tender [Abdomen Mass (___ Cm)] : no abdominal mass palpated [Cervical Lymph Nodes Enlarged Posterior Bilaterally] : posterior cervical [Cervical Lymph Nodes Enlarged Anterior Bilaterally] : anterior cervical [Supraclavicular Lymph Nodes Enlarged Bilaterally] : supraclavicular [Axillary Lymph Nodes Enlarged Bilaterally] : axillary [Femoral Lymph Nodes Enlarged Bilaterally] : femoral [Inguinal Lymph Nodes Enlarged Bilaterally] : inguinal [Abnormal Walk] : normal gait [Musculoskeletal - Swelling] : no joint swelling seen [Nail Clubbing] : no clubbing  or cyanosis of the fingernails [Motor Tone] : muscle strength and tone were normal [Skin Color & Pigmentation] : normal skin color and pigmentation [Skin Turgor] : normal skin turgor [] : no rash [Deep Tendon Reflexes (DTR)] : deep tendon reflexes were 2+ and symmetric [Sensation] : the sensory exam was normal to light touch and pinprick [No Focal Deficits] : no focal deficits [Oriented To Time, Place, And Person] : oriented to person, place, and time [Impaired Insight] : insight and judgment were intact [Affect] : the affect was normal

## 2024-08-27 NOTE — REVIEW OF SYSTEMS
[Shortness Of Breath] : shortness of breath [Wheezing] : wheezing [Cough] : no cough [SOB on Exertion] : shortness of breath during exertion [Orthopnea] : no orthopnea [PND] : no PND [Negative] : Heme/Lymph

## 2024-09-06 ENCOUNTER — APPOINTMENT (OUTPATIENT)
Dept: OPHTHALMOLOGY | Facility: CLINIC | Age: 84
End: 2024-09-06
Payer: MEDICARE

## 2024-09-06 ENCOUNTER — APPOINTMENT (OUTPATIENT)
Dept: OPHTHALMOLOGY | Facility: CLINIC | Age: 84
End: 2024-09-06
Payer: SELF-PAY

## 2024-09-06 ENCOUNTER — NON-APPOINTMENT (OUTPATIENT)
Age: 84
End: 2024-09-06

## 2024-09-06 PROCEDURE — 92060 SENSORIMOTOR EXAMINATION: CPT

## 2024-09-06 PROCEDURE — 92015 DETERMINE REFRACTIVE STATE: CPT

## 2024-09-11 ENCOUNTER — APPOINTMENT (OUTPATIENT)
Dept: ELECTROPHYSIOLOGY | Facility: CLINIC | Age: 84
End: 2024-09-11

## 2024-09-11 VITALS
DIASTOLIC BLOOD PRESSURE: 76 MMHG | HEART RATE: 86 BPM | WEIGHT: 180 LBS | OXYGEN SATURATION: 96 % | HEIGHT: 71 IN | BODY MASS INDEX: 25.2 KG/M2 | SYSTOLIC BLOOD PRESSURE: 158 MMHG

## 2024-09-11 PROCEDURE — 99214 OFFICE O/P EST MOD 30 MIN: CPT

## 2024-09-11 NOTE — DISCUSSION/SUMMARY
[FreeTextEntry1] : Persistent atrial fibrillation: Seems to be persistent since 2018.  Appears to be rate controlled.  He is not symptomatic from the A. fib.  Anticoagulated with Eliquis.  No bleeding issue.  Left atrial diameter 5.4 cm and left atrial volume index 60 cc per metered square.  The patient is not symptomatic from A. fib and AV maintained sinus rhythm given the severely dilated left atrium.  Would recommend rate control and anticoagulation.\par  \par  Single-chamber pacemaker: The pacemaker was interrogated and functioning normally.  It shows that he is in atrial fibrillation.  He is mostly ventricular paced.  We will follow-up left ventricular function and if evidence of decline we will consider left ventricular pacing.\par  He has had NSVT on past remote monitoring but is on a beta-blocker and has preserved left ventricular function previous nonobstructive disease. He lost about 50 lbs in last year.  Recommend beta-blocker therapy.

## 2024-09-11 NOTE — HISTORY OF PRESENT ILLNESS
[FreeTextEntry1] : Patient is an 84-year-old man who is being evaluated for possible MitraClip procedure.  He is overall feeling well.  His pacemaker was interrogated today and he is 96.8% ventricular paced.  He denies chest pain.  He has fatigue and decreased functional status.        Patient is an 84-year-old man who is seen in evaluation for his atrial fibrillation as well as his permanent pacemaker.  Patient underwent implantation of a single-chamber permanent pacemaker at [facility] October 2018.  He has been feeling well and is not aware of the A. fib.  He has a history of asthma as well as COPD and has shortness of breath related. Zio patch monitor from 8/10/2020 for 6 days showed atrial fibrillation The patient is on Eliquis 5 mg twice daily.  He is on inhalers for his asthma/COPD.  In addition the patient is on metoprolol succinate ER 50 mg daily.  He has a history of diabetes and is on metformin.  He takes diuretic.  He saw Dr. Valentino on August 10, 2022.  The patient had previous erythema as well as facial swelling nausea vomiting diarrhea after meals.  He was diagnosed with possible angioedema and lisinopril was discontinued on 8/8/2022.  He has a history of hypertension, dyslipidemia, diabetes, TIA, COPD, migraine, allergy to aspirin with anaphylaxis, previous left total hip.  He had previous cardiac catheterization 2017 that showed nonobstructive coronary arteries.  The patient's had prior atrial fibrillation, right bundle branch block and underwent a permanent pacemaker implantation single-chamber 2018.  He has normal ejection fraction.

## 2024-09-19 ENCOUNTER — TRANSCRIPTION ENCOUNTER (OUTPATIENT)
Age: 84
End: 2024-09-19

## 2024-09-19 ENCOUNTER — OUTPATIENT (OUTPATIENT)
Dept: OUTPATIENT SERVICES | Facility: HOSPITAL | Age: 84
LOS: 1 days | End: 2024-09-19
Payer: MEDICARE

## 2024-09-19 ENCOUNTER — RESULT REVIEW (OUTPATIENT)
Age: 84
End: 2024-09-19

## 2024-09-19 VITALS
OXYGEN SATURATION: 98 % | WEIGHT: 177.91 LBS | HEART RATE: 62 BPM | SYSTOLIC BLOOD PRESSURE: 126 MMHG | RESPIRATION RATE: 15 BRPM | HEIGHT: 72 IN | DIASTOLIC BLOOD PRESSURE: 91 MMHG | TEMPERATURE: 98 F

## 2024-09-19 VITALS
SYSTOLIC BLOOD PRESSURE: 126 MMHG | DIASTOLIC BLOOD PRESSURE: 78 MMHG | HEART RATE: 61 BPM | RESPIRATION RATE: 17 BRPM | OXYGEN SATURATION: 95 %

## 2024-09-19 DIAGNOSIS — I34.9 NONRHEUMATIC MITRAL VALVE DISORDER, UNSPECIFIED: ICD-10-CM

## 2024-09-19 DIAGNOSIS — E78.5 HYPERLIPIDEMIA, UNSPECIFIED: ICD-10-CM

## 2024-09-19 LAB
ANION GAP SERPL CALC-SCNC: 9 MMOL/L — SIGNIFICANT CHANGE UP (ref 5–17)
BASOPHILS # BLD AUTO: 0.04 K/UL — SIGNIFICANT CHANGE UP (ref 0–0.2)
BASOPHILS NFR BLD AUTO: 0.6 % — SIGNIFICANT CHANGE UP (ref 0–2)
BUN SERPL-MCNC: 14.8 MG/DL — SIGNIFICANT CHANGE UP (ref 8–20)
CALCIUM SERPL-MCNC: 9.2 MG/DL — SIGNIFICANT CHANGE UP (ref 8.4–10.5)
CHLORIDE SERPL-SCNC: 101 MMOL/L — SIGNIFICANT CHANGE UP (ref 96–108)
CO2 SERPL-SCNC: 30 MMOL/L — HIGH (ref 22–29)
CREAT SERPL-MCNC: 0.78 MG/DL — SIGNIFICANT CHANGE UP (ref 0.5–1.3)
EGFR: 88 ML/MIN/1.73M2 — SIGNIFICANT CHANGE UP
EOSINOPHIL # BLD AUTO: 0.38 K/UL — SIGNIFICANT CHANGE UP (ref 0–0.5)
EOSINOPHIL NFR BLD AUTO: 5.7 % — SIGNIFICANT CHANGE UP (ref 0–6)
GLUCOSE SERPL-MCNC: 104 MG/DL — HIGH (ref 70–99)
HCT VFR BLD CALC: 43.2 % — SIGNIFICANT CHANGE UP (ref 39–50)
HGB BLD-MCNC: 14.5 G/DL — SIGNIFICANT CHANGE UP (ref 13–17)
IMM GRANULOCYTES NFR BLD AUTO: 0.4 % — SIGNIFICANT CHANGE UP (ref 0–0.9)
LYMPHOCYTES # BLD AUTO: 0.81 K/UL — LOW (ref 1–3.3)
LYMPHOCYTES # BLD AUTO: 12.1 % — LOW (ref 13–44)
MCHC RBC-ENTMCNC: 30 PG — SIGNIFICANT CHANGE UP (ref 27–34)
MCHC RBC-ENTMCNC: 33.6 GM/DL — SIGNIFICANT CHANGE UP (ref 32–36)
MCV RBC AUTO: 89.3 FL — SIGNIFICANT CHANGE UP (ref 80–100)
MONOCYTES # BLD AUTO: 0.56 K/UL — SIGNIFICANT CHANGE UP (ref 0–0.9)
MONOCYTES NFR BLD AUTO: 8.4 % — SIGNIFICANT CHANGE UP (ref 2–14)
NEUTROPHILS # BLD AUTO: 4.88 K/UL — SIGNIFICANT CHANGE UP (ref 1.8–7.4)
NEUTROPHILS NFR BLD AUTO: 72.8 % — SIGNIFICANT CHANGE UP (ref 43–77)
PLATELET # BLD AUTO: 209 K/UL — SIGNIFICANT CHANGE UP (ref 150–400)
POTASSIUM SERPL-MCNC: 4.1 MMOL/L — SIGNIFICANT CHANGE UP (ref 3.5–5.3)
POTASSIUM SERPL-SCNC: 4.1 MMOL/L — SIGNIFICANT CHANGE UP (ref 3.5–5.3)
RBC # BLD: 4.84 M/UL — SIGNIFICANT CHANGE UP (ref 4.2–5.8)
RBC # FLD: 14.5 % — SIGNIFICANT CHANGE UP (ref 10.3–14.5)
SODIUM SERPL-SCNC: 140 MMOL/L — SIGNIFICANT CHANGE UP (ref 135–145)
WBC # BLD: 6.7 K/UL — SIGNIFICANT CHANGE UP (ref 3.8–10.5)
WBC # FLD AUTO: 6.7 K/UL — SIGNIFICANT CHANGE UP (ref 3.8–10.5)

## 2024-09-19 PROCEDURE — 80048 BASIC METABOLIC PNL TOTAL CA: CPT

## 2024-09-19 PROCEDURE — 93320 DOPPLER ECHO COMPLETE: CPT

## 2024-09-19 PROCEDURE — 93325 DOPPLER ECHO COLOR FLOW MAPG: CPT | Mod: 26

## 2024-09-19 PROCEDURE — 93325 DOPPLER ECHO COLOR FLOW MAPG: CPT

## 2024-09-19 PROCEDURE — 76376 3D RENDER W/INTRP POSTPROCES: CPT

## 2024-09-19 PROCEDURE — 36415 COLL VENOUS BLD VENIPUNCTURE: CPT

## 2024-09-19 PROCEDURE — 76376 3D RENDER W/INTRP POSTPROCES: CPT | Mod: 26

## 2024-09-19 PROCEDURE — 93312 ECHO TRANSESOPHAGEAL: CPT

## 2024-09-19 PROCEDURE — 93312 ECHO TRANSESOPHAGEAL: CPT | Mod: 26

## 2024-09-19 PROCEDURE — 93010 ELECTROCARDIOGRAM REPORT: CPT

## 2024-09-19 PROCEDURE — 93005 ELECTROCARDIOGRAM TRACING: CPT

## 2024-09-19 PROCEDURE — 85025 COMPLETE CBC W/AUTO DIFF WBC: CPT

## 2024-09-19 PROCEDURE — 93320 DOPPLER ECHO COMPLETE: CPT | Mod: 26

## 2024-09-19 RX ORDER — CRANBERRY FRUIT EXTRACT 650 MG
1200 CAPSULE ORAL
Refills: 0 | DISCHARGE

## 2024-09-19 RX ORDER — ROSUVASTATIN CALCIUM 10 MG/1
1 TABLET ORAL
Refills: 0 | DISCHARGE

## 2024-09-19 RX ORDER — GLUCOSAMINE/MSM/CHONDROITIN A 500-83-400
1 TABLET ORAL
Refills: 0 | DISCHARGE

## 2024-09-19 RX ORDER — CHLORHEXIDINE GLUCONATE 40 MG/ML
1 SOLUTION TOPICAL ONCE
Refills: 0 | Status: ACTIVE | OUTPATIENT
Start: 2024-09-19

## 2024-09-19 RX ORDER — SACUBITRIL AND VALSARTAN 49; 51 MG/1; MG/1
1 TABLET, FILM COATED ORAL
Qty: 60 | Refills: 5
Start: 2024-09-19

## 2024-09-19 RX ORDER — DAPAGLIFLOZIN 10 MG/1
1 TABLET, FILM COATED ORAL
Qty: 30 | Refills: 5
Start: 2024-09-19

## 2024-09-19 RX ORDER — METOPROLOL TARTRATE 100 MG/1
1 TABLET ORAL
Refills: 0 | DISCHARGE

## 2024-09-19 RX ORDER — MONTELUKAST SODIUM 5 MG/1
1 TABLET, CHEWABLE ORAL
Refills: 0 | DISCHARGE

## 2024-09-19 RX ORDER — FINASTERIDE 1 MG/1
1 TABLET, FILM COATED ORAL
Refills: 0 | DISCHARGE

## 2024-09-19 RX ORDER — FLUTICASONE FUROATE, UMECLIDINIUM BROMIDE AND VILANTEROL TRIFENATATE 200; 62.5; 25 UG/1; UG/1; UG/1
1 POWDER RESPIRATORY (INHALATION)
Refills: 0 | DISCHARGE

## 2024-09-19 RX ORDER — FUROSEMIDE 40 MG
1 TABLET ORAL
Refills: 0 | DISCHARGE

## 2024-09-19 RX ORDER — APIXABAN 5 MG/1
1 TABLET, FILM COATED ORAL
Refills: 0 | DISCHARGE

## 2024-09-19 RX ORDER — ASCORBIC ACID/ASCORBATE SODIUM 500 MG
1 TABLET,CHEWABLE ORAL
Refills: 0 | DISCHARGE

## 2024-09-19 RX ORDER — LORATADINE 10 MG/1
1 CAPSULE, LIQUID FILLED ORAL
Refills: 0 | DISCHARGE

## 2024-09-19 NOTE — DISCHARGE NOTE PROVIDER - NSDCFUSCHEDAPPT_GEN_ALL_CORE_FT
Valentino, Patrick P  [facility]  CARDIOLOGY [address]  Scheduled Appointment: 09/24/2024    Yusef Orta  [facility]  OPHTHALM [address]  Scheduled Appointment: 11/08/2024    [facility]  CARDIOLOGY [address]  Scheduled Appointment: 12/11/2024

## 2024-09-19 NOTE — H&P PST ADULT - HISTORY OF PRESENT ILLNESS
Department of Cardiology                                                                  [facility]/[place] [address]                                                            Telephone: [phone]. 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 He reports a decline in activity tolerance with golf. He had a recent echo which suggests moderate to severe MR. He presents for DAMON.    ASA and Mallampati: Per Anesthesia    	  MEDICATIONS:              chlorhexidine 4% Liquid 1 Application(s) Topical once        PHYSICAL EXAM:    T(C): --  HR: --  BP: --  RR: --  SpO2: --  Wt(kg): --    I&O's Summary      Daily     Daily     Constitutional: A & O x 3  HEENT:   Normal oral mucosa, PERRL, EOMI	  Cardiovascular: Normal S1 S2, No JVD, + murmurs, No edema  Respiratory: Lungs clear to auscultation	  Gastrointestinal:  Soft, Non-tender, + BS	  Skin: No rashes, No ecchymoses, No cyanosis  Neurologic: Non-focal  Extremities: Normal range of motion, No clubbing, cyanosis or edema  Vascular: Peripheral pulses palpable 2+ bilaterally    TELEMETRY: 	      ECG:  	    Diagnostics    LABS:	 	    CARDIAC MARKERS:                    proBNP:   Lipid Profile:   HgA1c:   TSH:                                                                              Department of Cardiology                                                                  [facility]/[place] [address] [address] [facility]                                                            Telephone: [phone]. 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 He reports a decline in activity tolerance with golf. He had a recent echo which suggests moderate to severe MR. He presents for DAMON.    ASA and Mallampati: Per Anesthesia    	  MEDICATIONS:  Home Medications:  Claritin 24 Hour Allergy 10 mg oral tablet: 1 tab(s) orally once a day (19 Sep 2024 12:55)  CoQ10 100 mg oral capsule: 1 cap(s) orally once a day (19 Sep 2024 12:55)  metoprolol succinate 50 mg oral tablet, extended release: 1 tab(s) orally once a day (19 Sep 2024 12:55)  omega-3 polyunsaturated fatty acids: 1,200 milligram(s) once a day (19 Sep 2024 12:55)  rosuvastatin 5 mg oral capsule: 1 cap(s) orally once a day (at bedtime) (19 Sep 2024 12:55)  Vitamin C 500 mg oral tablet, chewable: 1 tab(s) chewed once a day (19 Sep 2024 12:55)  zinc (as gluconate) 30 mg oral tablet: 1 tab(s) orally once a day (19 Sep 2024 12:55)              chlorhexidine 4% Liquid 1 Application(s) Topical once        PHYSICAL EXAM:    T(C): --  HR: --  BP: --  RR: --  SpO2: --  Wt(kg): --    I&O's Summary      Daily     Daily     Constitutional: A & O x 3  HEENT:   Normal oral mucosa, PERRL, EOMI	  Cardiovascular: Normal S1 S2, No JVD, + murmurs, No edema  Respiratory: Lungs clear to auscultation	  Gastrointestinal:  Soft, Non-tender, + BS	  Skin: No rashes, No ecchymoses, No cyanosis  Neurologic: Non-focal  Extremities: Normal range of motion, No clubbing, cyanosis or edema  Vascular: Peripheral pulses palpable 2+ bilaterally    TELEMETRY: 	 Paced     ECG:  Paced	    Diagnostics    LABS:	                           14.5   6.70  )-----------( 209      ( 19 Sep 2024 12:00 )             43.2     09-19    140  |  101  |  14.8  ----------------------------<  104[H]  4.1   |  30.0[H]  |  0.78    Ca    9.2      19 Sep 2024 12:00    	    CARDIAC MARKERS:                    proBNP:   Lipid Profile:   HgA1c:   TSH:

## 2024-09-19 NOTE — H&P PST ADULT - ASSESSMENT
ASSESSMENT:     -DAMON as ordered  -Labs and ECG reviewed  -Procedure discussed with patient; risks and benefits explained; questions answered  -Consent obtained by Echocardiographer and anesthesiologist

## 2024-09-19 NOTE — DISCHARGE NOTE PROVIDER - NSDCCPCAREPLAN_GEN_ALL_CORE_FT
PRINCIPAL DISCHARGE DIAGNOSIS  Diagnosis: Severe mitral valve regurgitation  Assessment and Plan of Treatment: Follow-up with Dr. Calle for possible mitral valve clip      SECONDARY DISCHARGE DIAGNOSES  Diagnosis: Hypertension  Assessment and Plan of Treatment: Continue to take antihypertensive medications as prescribed. Obtain a home blood pressure monitor (at any pharmacy or medical supply store) and monitor blood pressure trends. Call your doctor if you note you blood pressure to be running much higher or lower than usual. Limit salt intake.

## 2024-09-19 NOTE — DISCHARGE NOTE PROVIDER - NSDCCPTREATMENT_GEN_ALL_CORE_FT
PRINCIPAL PROCEDURE  Procedure: Transesophageal echocardiogram (DAMON)  Findings and Treatment: Do not drive or operate machinery today as you have received sedation. You should take it easy today and take your time, especially when changing positions. Follow up with Dr. Valentino to further discuss the results of the DAMON and any further evaluations and recommendations going forward.

## 2024-09-19 NOTE — DISCHARGE NOTE PROVIDER - CARE PROVIDER_API CALL
Yonathan Calle  Thoracic and Cardiac Surgery  [address] [place] [id number]  Phone: [phone]  Fax: [phone]  Follow Up Time: 1 week

## 2024-09-19 NOTE — DISCHARGE NOTE NURSING/CASE MANAGEMENT/SOCIAL WORK - NSDCPEFALRISK_GEN_ALL_CORE
For information on Fall & Injury Prevention, visit: [email or web address] OR  [email or web address] OR  https://www.cdc.gov/steadi/patient.html

## 2024-09-19 NOTE — DISCHARGE NOTE PROVIDER - HOSPITAL COURSE
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 He reports a decline in activity tolerance with golf. He had a recent echo which suggests moderate to severe MR. He presents for DAMON.    s/p DAMON which revealed EF < 20% and severe MR    -Post DAMON orders per protocol  -Post DAMON montioring per protocol  -Formal DAMON report pending  -DC home and follow-up with Dr. Calle.

## 2024-09-19 NOTE — DISCHARGE NOTE NURSING/CASE MANAGEMENT/SOCIAL WORK - PATIENT PORTAL LINK FT
Interventional Radiology Focus Note    D/w Dr. Herrera and Dr. Card.    Spontaneous bleeding to LLE- h/o this happening in other sites on his body per patient.   Dressing is in place currently to LLE. H&H 6.8.   Plan for dialysis tomorrow.     Dr. Card recommends CTA LLE which was performed and reviewed. No signs of active extravasation, but should be noted sub optimal study due to timing of contrast and habitus. With patient's BMI, weight is also above IR table limit. No angiogram/ embolization recommended at this time. Dr. Herrera updated.     Mecca Montero PA-C   You can access the FollowMyHealth Patient Portal offered by [facility] by registering at the following website: [email or web address]. By joining [employer]’s FollowMyHealth portal, you will also be able to view your health information using other applications (apps) compatible with our system.

## 2024-09-24 ENCOUNTER — APPOINTMENT (OUTPATIENT)
Dept: CARDIOLOGY | Facility: CLINIC | Age: 84
End: 2024-09-24
Payer: MEDICARE

## 2024-09-24 VITALS
OXYGEN SATURATION: 96 % | DIASTOLIC BLOOD PRESSURE: 52 MMHG | SYSTOLIC BLOOD PRESSURE: 104 MMHG | HEIGHT: 71 IN | WEIGHT: 179 LBS | BODY MASS INDEX: 25.06 KG/M2 | HEART RATE: 99 BPM

## 2024-09-24 DIAGNOSIS — R23.2 FLUSHING: ICD-10-CM

## 2024-09-24 DIAGNOSIS — H53.2 DIPLOPIA: ICD-10-CM

## 2024-09-24 DIAGNOSIS — R26.9 UNSPECIFIED ABNORMALITIES OF GAIT AND MOBILITY: ICD-10-CM

## 2024-09-24 DIAGNOSIS — I42.8 OTHER CARDIOMYOPATHIES: ICD-10-CM

## 2024-09-24 DIAGNOSIS — R06.09 OTHER FORMS OF DYSPNEA: ICD-10-CM

## 2024-09-24 DIAGNOSIS — E78.2 MIXED HYPERLIPIDEMIA: ICD-10-CM

## 2024-09-24 PROCEDURE — 99215 OFFICE O/P EST HI 40 MIN: CPT

## 2024-09-24 PROCEDURE — G2211 COMPLEX E/M VISIT ADD ON: CPT

## 2024-09-24 RX ORDER — DAPAGLIFLOZIN 5 MG/1
5 TABLET, FILM COATED ORAL DAILY
Refills: 0 | Status: ACTIVE | COMMUNITY

## 2024-09-24 RX ORDER — SACUBITRIL AND VALSARTAN 24; 26 MG/1; MG/1
24-26 TABLET, FILM COATED ORAL TWICE DAILY
Refills: 0 | Status: ACTIVE | COMMUNITY

## 2024-09-24 NOTE — REASON FOR VISIT
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  Patient has dyspnea with mild exertion. Cardiovascular review of symptoms is negative for exertional chest pain, palpitations, dizziness or syncope. No PND or orthopnea leg edema. No bleeding or black stool.  No exercise routine.  Patient is walking 10 minutes on occasion.  Multiple unresponsive episodes with facial flushing arm weakness and urinary bladder incontinence.  Patient following up with neurologist Dr. Champion with EEG  DAMON 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  Echocardiogram July 2024 LVEF 31%, moderate to severe MR, moderate pH   Echocardiogram Aug 2023 LVEF 40%, mild-moderate MR, normal RVSP.  Echocardiogram April 2023 LVEF 53%, moderate MR, mild PH  Carotid and abdominal ultrasound April 2023, mild nonobstructive plaque, normal abdominal aortic size.   Cardiac catheterization June 2022 nonobstructive coronaries  Echocardiogram April 2022 LVEF 55%, apical akinesis, mild to moderate MR, mild TR, mild PAH  Cardiac catheterization April 2021, LVEF 60%, nonobstructive coronaries  Patient had neck and jaw pain with swelling lasting 30 minutes sent to [facility] ER 1/11/2021, normal troponin, nondiagnostic EKG with V-pacing, no further pain discharged for outpatient cardiology follow-up.   Patient on nebulizer therapy for COPD.  Patient has increasing NSVT up to 30 seconds on remote monitoring, continue on lopressor 25mg twice daily, well tolerated.  Patient seen by Dr Broussard 9/19/18 found to have A. fib ventricular rate 30s diltiazem reduced from 240mg to 180mg daily.  Currently remains in A. fib with slow ventricular rate.  Cardizem will be changed to 30mg twice per day with heart rate and blood pressure check prior to dose.  Patient will hold Cardizem for heart rate less than 50bpm. Holter monitor will be done today. Repeat labs ordered. Patient scheduled to followup with electrophysiologist Dr Burch.   Patient admitted  [facility] diploplia r/o TIA, NSVT, normal LVEF, asymptomatic AF on Eliquis, cardizem. Cardiology was consulted for brief asymptomatic NSVT, Baseline AFib controlled ventricular rate.   2-D echo was repeated with normal LVEF 60%.  No change in medications  Patient had asymptomatic recurrence of A. fib and successful elective DC cardioversion [facility] 8/29/17. EKG remains sinus rhythm first degree AV block LAD, RBBB, age undetermined IMI and AMI  Patient had coughing and wheezing bronchitis and COPD exacerbation August 2017, following with pulmonologist Dr. Contreras.  Pacemaker check  reveals normally functioning pacemaker adequate thresholds and battery.  Pacemaker will be checked every 3 months.   Lexiscan Myoview stress test September 2020, small fixed inferior apical defect no ischemia, LVEF 42%, nondiagnostic EKG with baseline V pacing, hypotension with Lexiscan resolved with Aminophyllin  Echocardiogram May 2020, LVEF 50%, diastolic dysfunction, mild to moderate MR, mild TR, normal RVSP  Echocardiogram PCP 5/3/19, LVEF 65% normal RVSP moderate MR, mild TR  Carotid Doppler PCP 5/3/19 mild nonobstructive plaque  Lexascan Myoview stress September 2017, LVEF 50%, medium size inferior infarct without ischemia, unchanged, no anginal symptoms, baseline sinus rhythm, IVCD, LAD, old ASWMI  2-D echo [facility] 6/5/18 LVEF 60% mild MR and TR, normal PASP  2-D echo September 2017, LVEF 60%, mild diastolic dysfunction, mild MR TR, normal PASP  Carotid and abdominal ultrasound September 2017, nonobstructive plaque, proximal aorta 2.7 centimeter

## 2024-09-24 NOTE — REASON FOR VISIT
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  Patient has dyspnea with mild exertion. Cardiovascular review of symptoms is negative for exertional chest pain, palpitations, dizziness or syncope. No PND or orthopnea leg edema. No bleeding or black stool.  No exercise routine.  Patient is walking 10 minutes on occasion.  Multiple unresponsive episodes with facial flushing arm weakness and urinary bladder incontinence.  Patient following up with neurologist Dr. 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  Echocardiogram July 2024 LVEF 31%, moderate to severe MR, moderate pH   Echocardiogram Aug 2023 LVEF 40%, mild-moderate MR, normal RVSP.  Echocardiogram April 2023 LVEF 53%, moderate MR, mild PH  Carotid and abdominal ultrasound April 2023, mild nonobstructive plaque, normal abdominal aortic size.   Cardiac catheterization June 2022 nonobstructive coronaries  Echocardiogram April 2022 LVEF 55%, apical akinesis, mild to moderate MR, mild TR, mild PAH  Cardiac catheterization April 2021, LVEF 60%, nonobstructive coronaries  Patient had neck and jaw pain with swelling lasting 30 minutes sent to [facility] ER 1/11/2021, normal troponin, nondiagnostic EKG with V-pacing, no further pain discharged for outpatient cardiology follow-up.   Patient on nebulizer therapy for COPD.  Patient has increasing NSVT up to 30 seconds on remote monitoring, continue on lopressor 25mg twice daily, well tolerated.  Patient seen by Dr Broussard 9/19/18 found to have A. fib ventricular rate 30s diltiazem reduced from 240mg to 180mg daily.  Currently remains in A. fib with slow ventricular rate.  Cardizem will be changed to 30mg twice per day with heart rate and blood pressure check prior to dose.  Patient will hold Cardizem for heart rate less than 50bpm. Holter monitor will be done today. Repeat labs ordered. Patient scheduled to followup with electrophysiologist Dr Burch.   Patient admitted  [facility] diploplia r/o TIA, NSVT, normal LVEF, asymptomatic AF on Eliquis, cardizem. Cardiology was consulted for brief asymptomatic NSVT, Baseline AFib controlled ventricular rate.   2-D echo was repeated with normal LVEF 60%.  No change in medications  Patient had asymptomatic recurrence of A. fib and successful elective DC cardioversion [facility] 8/29/17. EKG remains sinus rhythm first degree AV block LAD, RBBB, age undetermined IMI and AMI  Patient had coughing and wheezing bronchitis and COPD exacerbation August 2017, following with pulmonologist Dr. Contreras.  Pacemaker check  reveals normally functioning pacemaker adequate thresholds and battery.  Pacemaker will be checked every 3 months.   Lexiscan Myoview stress test September 2020, small fixed inferior apical defect no ischemia, LVEF 42%, nondiagnostic EKG with baseline V pacing, hypotension with Lexiscan resolved with Aminophyllin  Echocardiogram May 2020, LVEF 50%, diastolic dysfunction, mild to moderate MR, mild TR, normal RVSP  Echocardiogram PCP 5/3/19, LVEF 65% normal RVSP moderate MR, mild TR  Carotid Doppler PCP 5/3/19 mild nonobstructive plaque  Lexascan Myoview stress September 2017, LVEF 50%, medium size inferior infarct without ischemia, unchanged, no anginal symptoms, baseline sinus rhythm, IVCD, LAD, old ASWMI  2-D echo [facility] 6/5/18 LVEF 60% mild MR and TR, normal PASP  2-D echo September 2017, LVEF 60%, mild diastolic dysfunction, mild MR TR, normal PASP  Carotid and abdominal ultrasound September 2017, nonobstructive plaque, proximal aorta 2.7 centimeter

## 2024-09-24 NOTE — DISCUSSION/SUMMARY
[FreeTextEntry1] : Patient has medical history detailed above and active medical issues including:  - HFrEF, reduced LVEF 18% DAMON on GDMT.  Continue Toprol, intolerant to ACE with angioedema, continue Jardiance 10 Mg daily.  Nonobstructive cath Aug 2024. EP evaluation for BiV ICD upgrade   - Severe MR, reduced LVEF, CTS consult for MV clip Dr Motta   - Follow-up after recent gait instability fall head strike CT head negative ambulation with a cane, gait physical therapy on Eliquis for AF  - No anginal symptoms, nonobstructive catheterization with normal LVEF April 2021 and June 2022.  - Erythema and facial swelling, nausea vomiting diarrhea after meals, possible angioedema lisinopril discontinued 8/8/2022.  Patient referred to allergist Dr. Leoncio John [phone].   - Multiple unresponsive episodes with facial flushing arm weakness and urinary bladder incontinence.  Patient following up with neurologist Dr. Champion, EEG recently completed, ENT follow-up  - Medtronic PPM implant 10/11/18 for SSS/PAF on Eliquis, Cardizem . 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 Patient educated on lifestyle and diet modification with low sodium low fat diet and avoidance of excessive alcohol. Patient is aware to call with any symptoms or concerns.    Patient will be seen in cardiology follow-up 2 months.  Current cardiac medications remain unchanged. Repeat labs will be ordered with PMD.  Hosea will follow up with Dr. Broussard for primary care.  Total time spent 45 minutes, reviewing of test results, chart information, patient discussion, physical exam and completion of chart documentation.

## 2024-09-24 NOTE — DISCUSSION/SUMMARY
[FreeTextEntry1] : Patient has medical history detailed above and active medical issues including:  - HFrEF, reduced LVEF 18% DAMON on GDMT.  Continue Toprol, intolerant to ACE with angioedema, continue Jardiance 10 Mg daily.  Nonobstructive cath Aug 2024. EP evaluation for BiV ICD upgrade   - Severe MR, reduced LVEF, CTS consult for MV clip Dr Motta   - Follow-up after recent gait instability fall head strike CT head negative ambulation with a cane, gait physical therapy on Eliquis for AF  - No anginal symptoms, nonobstructive catheterization with normal LVEF April 2021 and June 2022.  - Erythema and facial swelling, nausea vomiting diarrhea after meals, possible angioedema lisinopril discontinued 8/8/2022.  Patient referred to allergist Dr. Leoncio John [phone].   - Multiple unresponsive episodes with facial flushing arm weakness and urinary bladder incontinence.  Patient following up with neurologist Dr. Champion, EEG recently completed, ENT follow-up  - Medtronic PPM implant 10/11/18 for SSS/PAF on Eliquis, Cardizem . 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 Patient educated on lifestyle and diet modification with low sodium low fat diet and avoidance of excessive alcohol. Patient is aware to call with any symptoms or concerns.    Patient will be seen in cardiology follow-up 2 months.  Current cardiac medications remain unchanged. Repeat labs will be ordered with PMD.  Hosea will follow up with Dr. Broussard for primary care.  Total time spent 45 minutes, reviewing of test results, chart information, patient discussion, physical exam and completion of chart documentation.

## 2024-09-24 NOTE — REVIEW OF SYSTEMS
[Dyspnea on exertion] : dyspnea during exertion [Cough] : cough [Dizziness] : dizziness [Negative] : Heme/Lymph [de-identified] : Multiple syncopal episodes

## 2024-09-24 NOTE — REVIEW OF SYSTEMS
[Dyspnea on exertion] : dyspnea during exertion [Cough] : cough [Dizziness] : dizziness [Negative] : Heme/Lymph [de-identified] : Multiple syncopal episodes

## 2024-10-08 ENCOUNTER — APPOINTMENT (OUTPATIENT)
Dept: CARDIOTHORACIC SURGERY | Facility: CLINIC | Age: 84
End: 2024-10-08
Payer: MEDICARE

## 2024-10-08 VITALS
HEIGHT: 72 IN | WEIGHT: 176 LBS | OXYGEN SATURATION: 96 % | SYSTOLIC BLOOD PRESSURE: 128 MMHG | DIASTOLIC BLOOD PRESSURE: 83 MMHG | BODY MASS INDEX: 23.84 KG/M2 | RESPIRATION RATE: 19 BRPM | HEART RATE: 89 BPM

## 2024-10-08 DIAGNOSIS — I36.1 NONRHEUMATIC TRICUSPID (VALVE) INSUFFICIENCY: ICD-10-CM

## 2024-10-08 DIAGNOSIS — R00.1 BRADYCARDIA, UNSPECIFIED: ICD-10-CM

## 2024-10-08 DIAGNOSIS — Z95.0 PRESENCE OF CARDIAC PACEMAKER: ICD-10-CM

## 2024-10-08 DIAGNOSIS — I47.29 OTHER VENTRICULAR TACHYCARDIA: ICD-10-CM

## 2024-10-08 DIAGNOSIS — I10 ESSENTIAL (PRIMARY) HYPERTENSION: ICD-10-CM

## 2024-10-08 DIAGNOSIS — K21.9 GASTRO-ESOPHAGEAL REFLUX DISEASE W/OUT ESOPHAGITIS: ICD-10-CM

## 2024-10-08 DIAGNOSIS — I48.91 UNSPECIFIED ATRIAL FIBRILLATION: ICD-10-CM

## 2024-10-08 DIAGNOSIS — I34.0 NONRHEUMATIC MITRAL (VALVE) INSUFFICIENCY: ICD-10-CM

## 2024-10-08 PROCEDURE — 99215 OFFICE O/P EST HI 40 MIN: CPT

## 2024-10-14 ENCOUNTER — APPOINTMENT (OUTPATIENT)
Dept: CARDIOLOGY | Facility: CLINIC | Age: 84
End: 2024-10-14
Payer: MEDICARE

## 2024-10-14 ENCOUNTER — NON-APPOINTMENT (OUTPATIENT)
Age: 84
End: 2024-10-14

## 2024-10-14 DIAGNOSIS — I42.8 OTHER CARDIOMYOPATHIES: ICD-10-CM

## 2024-10-14 DIAGNOSIS — I34.0 NONRHEUMATIC MITRAL (VALVE) INSUFFICIENCY: ICD-10-CM

## 2024-10-14 PROBLEM — I50.1: Status: ACTIVE | Noted: 2024-10-14

## 2024-10-14 PROCEDURE — 99215 OFFICE O/P EST HI 40 MIN: CPT

## 2024-10-25 ENCOUNTER — NON-APPOINTMENT (OUTPATIENT)
Age: 84
End: 2024-10-25

## 2024-10-25 ENCOUNTER — APPOINTMENT (OUTPATIENT)
Dept: ELECTROPHYSIOLOGY | Facility: CLINIC | Age: 84
End: 2024-10-25
Payer: MEDICARE

## 2024-10-25 VITALS
OXYGEN SATURATION: 95 % | BODY MASS INDEX: 24.79 KG/M2 | HEART RATE: 99 BPM | HEIGHT: 72 IN | WEIGHT: 183 LBS | SYSTOLIC BLOOD PRESSURE: 134 MMHG | DIASTOLIC BLOOD PRESSURE: 62 MMHG

## 2024-10-25 LAB — HBA1C MFR BLD HPLC: 6

## 2024-10-25 PROCEDURE — 99214 OFFICE O/P EST MOD 30 MIN: CPT

## 2024-10-29 ENCOUNTER — APPOINTMENT (OUTPATIENT)
Dept: CARDIOLOGY | Facility: CLINIC | Age: 84
End: 2024-10-29
Payer: MEDICARE

## 2024-10-29 VITALS
BODY MASS INDEX: 24.79 KG/M2 | HEIGHT: 72 IN | SYSTOLIC BLOOD PRESSURE: 134 MMHG | WEIGHT: 183 LBS | OXYGEN SATURATION: 100 % | DIASTOLIC BLOOD PRESSURE: 64 MMHG | HEART RATE: 70 BPM

## 2024-10-29 DIAGNOSIS — G45.9 TRANSIENT CEREBRAL ISCHEMIC ATTACK, UNSPECIFIED: ICD-10-CM

## 2024-10-29 DIAGNOSIS — R55 SYNCOPE AND COLLAPSE: ICD-10-CM

## 2024-10-29 DIAGNOSIS — I47.29 OTHER VENTRICULAR TACHYCARDIA: ICD-10-CM

## 2024-10-29 DIAGNOSIS — I25.2 OLD MYOCARDIAL INFARCTION: ICD-10-CM

## 2024-10-29 DIAGNOSIS — R06.09 OTHER FORMS OF DYSPNEA: ICD-10-CM

## 2024-10-29 DIAGNOSIS — I50.20 UNSPECIFIED SYSTOLIC (CONGESTIVE) HEART FAILURE: ICD-10-CM

## 2024-10-29 DIAGNOSIS — I48.91 UNSPECIFIED ATRIAL FIBRILLATION: ICD-10-CM

## 2024-10-29 DIAGNOSIS — I50.1 LEFT VENTRICULAR FAILURE, UNSPECIFIED: ICD-10-CM

## 2024-10-29 DIAGNOSIS — E78.2 MIXED HYPERLIPIDEMIA: ICD-10-CM

## 2024-10-29 DIAGNOSIS — R26.9 UNSPECIFIED ABNORMALITIES OF GAIT AND MOBILITY: ICD-10-CM

## 2024-10-29 DIAGNOSIS — R00.1 BRADYCARDIA, UNSPECIFIED: ICD-10-CM

## 2024-10-29 DIAGNOSIS — I10 ESSENTIAL (PRIMARY) HYPERTENSION: ICD-10-CM

## 2024-10-29 DIAGNOSIS — I25.10 ATHEROSCLEROTIC HEART DISEASE OF NATIVE CORONARY ARTERY W/OUT ANGINA PECTORIS: ICD-10-CM

## 2024-10-29 PROCEDURE — G2211 COMPLEX E/M VISIT ADD ON: CPT

## 2024-10-29 PROCEDURE — 99215 OFFICE O/P EST HI 40 MIN: CPT

## 2024-11-08 ENCOUNTER — NON-APPOINTMENT (OUTPATIENT)
Age: 84
End: 2024-11-08

## 2024-11-08 ENCOUNTER — APPOINTMENT (OUTPATIENT)
Dept: OPHTHALMOLOGY | Facility: CLINIC | Age: 84
End: 2024-11-08
Payer: MEDICARE

## 2024-11-08 PROCEDURE — 99213 OFFICE O/P EST LOW 20 MIN: CPT

## 2024-11-14 ENCOUNTER — APPOINTMENT (OUTPATIENT)
Dept: CARDIOLOGY | Facility: CLINIC | Age: 84
End: 2024-11-14

## 2024-11-14 VITALS
DIASTOLIC BLOOD PRESSURE: 66 MMHG | BODY MASS INDEX: 25.5 KG/M2 | HEART RATE: 67 BPM | WEIGHT: 188 LBS | OXYGEN SATURATION: 90 % | SYSTOLIC BLOOD PRESSURE: 110 MMHG

## 2024-12-04 ENCOUNTER — APPOINTMENT (OUTPATIENT)
Dept: CARDIOLOGY | Facility: CLINIC | Age: 84
End: 2024-12-04

## 2024-12-10 ENCOUNTER — APPOINTMENT (OUTPATIENT)
Dept: CARDIOLOGY | Facility: CLINIC | Age: 84
End: 2024-12-10
Payer: MEDICARE

## 2024-12-10 VITALS
HEART RATE: 60 BPM | WEIGHT: 189 LBS | DIASTOLIC BLOOD PRESSURE: 60 MMHG | HEIGHT: 72 IN | SYSTOLIC BLOOD PRESSURE: 100 MMHG | OXYGEN SATURATION: 98 % | BODY MASS INDEX: 25.6 KG/M2

## 2024-12-10 DIAGNOSIS — I47.29 OTHER VENTRICULAR TACHYCARDIA: ICD-10-CM

## 2024-12-10 DIAGNOSIS — R55 SYNCOPE AND COLLAPSE: ICD-10-CM

## 2024-12-10 DIAGNOSIS — I50.20 UNSPECIFIED SYSTOLIC (CONGESTIVE) HEART FAILURE: ICD-10-CM

## 2024-12-10 DIAGNOSIS — R06.09 OTHER FORMS OF DYSPNEA: ICD-10-CM

## 2024-12-10 DIAGNOSIS — E78.2 MIXED HYPERLIPIDEMIA: ICD-10-CM

## 2024-12-10 DIAGNOSIS — I50.1 LEFT VENTRICULAR FAILURE, UNSPECIFIED: ICD-10-CM

## 2024-12-10 DIAGNOSIS — R26.9 UNSPECIFIED ABNORMALITIES OF GAIT AND MOBILITY: ICD-10-CM

## 2024-12-10 DIAGNOSIS — I48.91 UNSPECIFIED ATRIAL FIBRILLATION: ICD-10-CM

## 2024-12-10 DIAGNOSIS — I10 ESSENTIAL (PRIMARY) HYPERTENSION: ICD-10-CM

## 2024-12-10 DIAGNOSIS — H53.2 DIPLOPIA: ICD-10-CM

## 2024-12-10 DIAGNOSIS — R23.2 FLUSHING: ICD-10-CM

## 2024-12-10 DIAGNOSIS — I25.10 ATHEROSCLEROTIC HEART DISEASE OF NATIVE CORONARY ARTERY W/OUT ANGINA PECTORIS: ICD-10-CM

## 2024-12-10 DIAGNOSIS — R00.1 BRADYCARDIA, UNSPECIFIED: ICD-10-CM

## 2024-12-10 PROCEDURE — G2211 COMPLEX E/M VISIT ADD ON: CPT

## 2024-12-10 PROCEDURE — 99215 OFFICE O/P EST HI 40 MIN: CPT

## 2024-12-11 ENCOUNTER — NON-APPOINTMENT (OUTPATIENT)
Age: 84
End: 2024-12-11

## 2024-12-11 ENCOUNTER — APPOINTMENT (OUTPATIENT)
Dept: CARDIOLOGY | Facility: CLINIC | Age: 84
End: 2024-12-11
Payer: MEDICARE

## 2024-12-11 PROCEDURE — 93296 REM INTERROG EVL PM/IDS: CPT

## 2024-12-11 PROCEDURE — 93294 REM INTERROG EVL PM/LDLS PM: CPT

## 2025-01-13 ENCOUNTER — APPOINTMENT (OUTPATIENT)
Dept: CARDIOTHORACIC SURGERY | Facility: CLINIC | Age: 85
End: 2025-01-13

## 2025-01-13 ENCOUNTER — APPOINTMENT (OUTPATIENT)
Dept: CARDIOLOGY | Facility: CLINIC | Age: 85
End: 2025-01-13

## 2025-01-13 VITALS
HEART RATE: 57 BPM | RESPIRATION RATE: 18 BRPM | BODY MASS INDEX: 25.47 KG/M2 | DIASTOLIC BLOOD PRESSURE: 85 MMHG | SYSTOLIC BLOOD PRESSURE: 185 MMHG | HEIGHT: 72 IN | OXYGEN SATURATION: 98 % | WEIGHT: 188 LBS

## 2025-01-13 VITALS — SYSTOLIC BLOOD PRESSURE: 135 MMHG | DIASTOLIC BLOOD PRESSURE: 78 MMHG

## 2025-01-13 DIAGNOSIS — I36.1 NONRHEUMATIC TRICUSPID (VALVE) INSUFFICIENCY: ICD-10-CM

## 2025-01-13 DIAGNOSIS — I34.0 NONRHEUMATIC MITRAL (VALVE) INSUFFICIENCY: ICD-10-CM

## 2025-01-13 PROCEDURE — 99214 OFFICE O/P EST MOD 30 MIN: CPT

## 2025-01-13 RX ORDER — ZINC SULFATE 50(220)MG
CAPSULE ORAL
Refills: 0 | Status: ACTIVE | COMMUNITY

## 2025-01-29 ENCOUNTER — OUTPATIENT (OUTPATIENT)
Dept: OUTPATIENT SERVICES | Facility: HOSPITAL | Age: 85
LOS: 1 days | End: 2025-01-29
Payer: MEDICARE

## 2025-01-29 ENCOUNTER — RESULT REVIEW (OUTPATIENT)
Age: 85
End: 2025-01-29

## 2025-01-29 VITALS
TEMPERATURE: 97 F | OXYGEN SATURATION: 98 % | HEIGHT: 72 IN | WEIGHT: 185.19 LBS | HEART RATE: 60 BPM | DIASTOLIC BLOOD PRESSURE: 80 MMHG | SYSTOLIC BLOOD PRESSURE: 140 MMHG | RESPIRATION RATE: 16 BRPM

## 2025-01-29 DIAGNOSIS — Z96.641 PRESENCE OF RIGHT ARTIFICIAL HIP JOINT: Chronic | ICD-10-CM

## 2025-01-29 DIAGNOSIS — Z01.818 ENCOUNTER FOR OTHER PREPROCEDURAL EXAMINATION: ICD-10-CM

## 2025-01-29 DIAGNOSIS — I34.0 NONRHEUMATIC MITRAL (VALVE) INSUFFICIENCY: ICD-10-CM

## 2025-01-29 DIAGNOSIS — I48.91 UNSPECIFIED ATRIAL FIBRILLATION: ICD-10-CM

## 2025-01-29 DIAGNOSIS — Z29.9 ENCOUNTER FOR PROPHYLACTIC MEASURES, UNSPECIFIED: ICD-10-CM

## 2025-01-29 DIAGNOSIS — Z90.49 ACQUIRED ABSENCE OF OTHER SPECIFIED PARTS OF DIGESTIVE TRACT: Chronic | ICD-10-CM

## 2025-01-29 LAB
A1C WITH ESTIMATED AVERAGE GLUCOSE RESULT: 5.5 % — SIGNIFICANT CHANGE UP (ref 4–5.6)
ALBUMIN SERPL ELPH-MCNC: 4.4 G/DL — SIGNIFICANT CHANGE UP (ref 3.3–5.2)
ALP SERPL-CCNC: 104 U/L — SIGNIFICANT CHANGE UP (ref 40–120)
ALT FLD-CCNC: 31 U/L — SIGNIFICANT CHANGE UP
ANION GAP SERPL CALC-SCNC: 14 MMOL/L — SIGNIFICANT CHANGE UP (ref 5–17)
APPEARANCE UR: CLEAR — SIGNIFICANT CHANGE UP
APTT BLD: 39.3 SEC — HIGH (ref 24.5–35.6)
AST SERPL-CCNC: 31 U/L — SIGNIFICANT CHANGE UP
BASOPHILS # BLD AUTO: 0.06 K/UL — SIGNIFICANT CHANGE UP (ref 0–0.2)
BASOPHILS NFR BLD AUTO: 0.9 % — SIGNIFICANT CHANGE UP (ref 0–2)
BILIRUB SERPL-MCNC: 0.8 MG/DL — SIGNIFICANT CHANGE UP (ref 0.4–2)
BILIRUB UR-MCNC: NEGATIVE — SIGNIFICANT CHANGE UP
BLD GP AB SCN SERPL QL: SIGNIFICANT CHANGE UP
BUN SERPL-MCNC: 19.2 MG/DL — SIGNIFICANT CHANGE UP (ref 8–20)
CALCIUM SERPL-MCNC: 9.4 MG/DL — SIGNIFICANT CHANGE UP (ref 8.4–10.5)
CHLORIDE SERPL-SCNC: 104 MMOL/L — SIGNIFICANT CHANGE UP (ref 96–108)
CO2 SERPL-SCNC: 27 MMOL/L — SIGNIFICANT CHANGE UP (ref 22–29)
COLOR SPEC: YELLOW — SIGNIFICANT CHANGE UP
CREAT SERPL-MCNC: 0.88 MG/DL — SIGNIFICANT CHANGE UP (ref 0.5–1.3)
DIFF PNL FLD: NEGATIVE — SIGNIFICANT CHANGE UP
EGFR: 85 ML/MIN/1.73M2 — SIGNIFICANT CHANGE UP
EOSINOPHIL # BLD AUTO: 0.48 K/UL — SIGNIFICANT CHANGE UP (ref 0–0.5)
EOSINOPHIL NFR BLD AUTO: 7.4 % — HIGH (ref 0–6)
ESTIMATED AVERAGE GLUCOSE: 111 MG/DL — SIGNIFICANT CHANGE UP (ref 68–114)
GLUCOSE SERPL-MCNC: 93 MG/DL — SIGNIFICANT CHANGE UP (ref 70–99)
GLUCOSE UR QL: >=1000 MG/DL
HCT VFR BLD CALC: 49.2 % — SIGNIFICANT CHANGE UP (ref 39–50)
HGB BLD-MCNC: 15.6 G/DL — SIGNIFICANT CHANGE UP (ref 13–17)
IMM GRANULOCYTES NFR BLD AUTO: 0.3 % — SIGNIFICANT CHANGE UP (ref 0–0.9)
INR BLD: 1.15 RATIO — SIGNIFICANT CHANGE UP (ref 0.85–1.16)
KETONES UR-MCNC: NEGATIVE MG/DL — SIGNIFICANT CHANGE UP
LEUKOCYTE ESTERASE UR-ACNC: NEGATIVE — SIGNIFICANT CHANGE UP
LYMPHOCYTES # BLD AUTO: 1.17 K/UL — SIGNIFICANT CHANGE UP (ref 1–3.3)
LYMPHOCYTES # BLD AUTO: 18.1 % — SIGNIFICANT CHANGE UP (ref 13–44)
MCHC RBC-ENTMCNC: 29.7 PG — SIGNIFICANT CHANGE UP (ref 27–34)
MCHC RBC-ENTMCNC: 31.7 G/DL — LOW (ref 32–36)
MCV RBC AUTO: 93.7 FL — SIGNIFICANT CHANGE UP (ref 80–100)
MONOCYTES # BLD AUTO: 0.5 K/UL — SIGNIFICANT CHANGE UP (ref 0–0.9)
MONOCYTES NFR BLD AUTO: 7.7 % — SIGNIFICANT CHANGE UP (ref 2–14)
MRSA PCR RESULT.: SIGNIFICANT CHANGE UP
NEUTROPHILS # BLD AUTO: 4.24 K/UL — SIGNIFICANT CHANGE UP (ref 1.8–7.4)
NEUTROPHILS NFR BLD AUTO: 65.6 % — SIGNIFICANT CHANGE UP (ref 43–77)
NITRITE UR-MCNC: NEGATIVE — SIGNIFICANT CHANGE UP
NT-PROBNP SERPL-SCNC: 2732 PG/ML — HIGH (ref 0–300)
PH UR: 5.5 — SIGNIFICANT CHANGE UP (ref 5–8)
PLATELET # BLD AUTO: 202 K/UL — SIGNIFICANT CHANGE UP (ref 150–400)
POTASSIUM SERPL-MCNC: 5.4 MMOL/L — HIGH (ref 3.5–5.3)
POTASSIUM SERPL-SCNC: 5.4 MMOL/L — HIGH (ref 3.5–5.3)
PREALB SERPL-MCNC: 20 MG/DL — SIGNIFICANT CHANGE UP (ref 18–38)
PROT SERPL-MCNC: 7.3 G/DL — SIGNIFICANT CHANGE UP (ref 6.6–8.7)
PROT UR-MCNC: SIGNIFICANT CHANGE UP MG/DL
PROTHROM AB SERPL-ACNC: 13.3 SEC — SIGNIFICANT CHANGE UP (ref 9.9–13.4)
RBC # BLD: 5.25 M/UL — SIGNIFICANT CHANGE UP (ref 4.2–5.8)
RBC # FLD: 14.1 % — SIGNIFICANT CHANGE UP (ref 10.3–14.5)
S AUREUS DNA NOSE QL NAA+PROBE: SIGNIFICANT CHANGE UP
SODIUM SERPL-SCNC: 145 MMOL/L — SIGNIFICANT CHANGE UP (ref 135–145)
SP GR SPEC: >1.03 — HIGH (ref 1–1.03)
T3 SERPL-MCNC: 79 NG/DL — LOW (ref 80–200)
T4 AB SER-ACNC: 5.3 UG/DL — SIGNIFICANT CHANGE UP (ref 4.5–12)
TSH SERPL-MCNC: 3.52 UIU/ML — SIGNIFICANT CHANGE UP (ref 0.27–4.2)
UROBILINOGEN FLD QL: 1 MG/DL — SIGNIFICANT CHANGE UP (ref 0.2–1)
WBC # BLD: 6.47 K/UL — SIGNIFICANT CHANGE UP (ref 3.8–10.5)
WBC # FLD AUTO: 6.47 K/UL — SIGNIFICANT CHANGE UP (ref 3.8–10.5)

## 2025-01-29 PROCEDURE — 71046 X-RAY EXAM CHEST 2 VIEWS: CPT | Mod: 26

## 2025-01-29 PROCEDURE — 93010 ELECTROCARDIOGRAM REPORT: CPT

## 2025-01-29 RX ORDER — CEFUROXIME AXETIL 500 MG
1500 TABLET ORAL ONCE
Refills: 0 | Status: DISCONTINUED | OUTPATIENT
Start: 2025-02-14 | End: 2025-02-14

## 2025-01-29 RX ORDER — BACTERIOSTATIC SODIUM CHLORIDE 0.9 %
3 VIAL (ML) INJECTION EVERY 8 HOURS
Refills: 0 | Status: DISCONTINUED | OUTPATIENT
Start: 2025-02-14 | End: 2025-02-14

## 2025-01-29 NOTE — H&P PST ADULT - FUNCTIONAL STATUS
Sydney Ren MD  [facility]   4-10 METS

## 2025-01-29 NOTE — H&P PST ADULT - NSICDXFAMILYHX_GEN_ALL_CORE_FT
FAMILY HISTORY:  Father  Still living? No  Family hx of lung cancer, Age at diagnosis: Age Unknown    Mother  Still living? No  Family history of breast cancer, Age at diagnosis: Age Unknown

## 2025-01-29 NOTE — H&P PST ADULT - ASSESSMENT
Pt instructed  on the last  dose of Eliquis and Farxiga   to stop 3 days before this surgery, last dose  2/10/25 as Dr. Calle's instruction   Pt is a 84 years old male seen today pre-op for Mitral Clip for Nonrheumatic mitral valve insufficiency. Pt reports he was referred by his cardiologist due to abnormal cardio imaging.(Echo and cardiac catheterization). Pt medical hx includes Medtronic cardiac pacemaker, AFib( on Eliquis), HTN, HLD, MATHIAS, CAD, OA, Constipation. Pt denies chest pain, denies SOB, denies peripheral edema, denies syncope, lightheadedness and ant other related issues. Pt scheduled for  surgery with DR. Calle on 25. Pt instructed on the last dose  of Eliquis and Farxiga 2/10/25 prior to this surgery as per Dr. Calle  Patient instructed on NPO protocol   Patient instructed to stop NSAID, all vitamins supplement, Fish oil, COQ 10,  herbals 5 days before this surgery.   Pt okay to take Tylenol as needed for pain   Patient will continue to take all his medications as prescribed    Patient instructed on infection prevention   Chlorhexidine scrub instructions provided  CAPRINI VTE 2.0 SCORE [CLOT updated 2019]    AGE RELATED RISK FACTORS                                                       MOBILITY RELATED FACTORS  [ ] Age 41-60 years                                            (1 Point)                    [ ] Bed rest                                                        (1 Point)  [ ] Age: 61-74 years                                           (2 Points)                  [ ] Plaster cast                                                   (2 Points)  [ X] Age= 75 years                                              (3 Points)                    [ ] Bed bound for more than 72 hours                 (2 Points)    DISEASE RELATED RISK FACTORS                                               GENDER SPECIFIC FACTORS  [ ] Edema in the lower extremities                       (1 Point)              [ ] Pregnancy                                                     (1 Point)  [ ] Varicose veins                                               (1 Point)                     [ ] Post-partum < 6 weeks                                   (1 Point)             [ X] BMI > 25 Kg/m2                                            (1 Point)                     [ ] Hormonal therapy  or oral contraception          (1 Point)                 [ ] Sepsis (in the previous month)                        (1 Point)               [ ] History of pregnancy complications                 (1 point)  [ ] Pneumonia or serious lung disease                                               [ ] Unexplained or recurrent                     (1 Point)           (in the previous month)                               (1 Point)  [ ] Abnormal pulmonary function test                     (1 Point)                 SURGERY RELATED RISK FACTORS  [ ] Acute myocardial infarction                              (1 Point)               [ ]  Section                                             (1 Point)  [ ] Congestive heart failure (in the previous month)  (1 Point)      [ ] Minor surgery                                                  (1 Point)   [ ] Inflammatory bowel disease                             (1 Point)               [ ] Arthroscopic surgery                                        (2 Points)  [ ] Central venous access                                      (2 Points)                [ X] General surgery lasting more than 45 minutes (2 points)  [ ] Malignancy- Present or previous                   (2 Points)                [ ] Elective arthroplasty                                         (5 points)    [ ] Stroke (in the previous month)                          (5 Points)                                                                                                                                                           HEMATOLOGY RELATED FACTORS                                                 TRAUMA RELATED RISK FACTORS  [ ] Prior episodes of VTE                                     (3 Points)                [ ] Fracture of the hip, pelvis, or leg                       (5 Points)  [ ] Positive family history for VTE                         (3 Points)             [ ] Acute spinal cord injury (in the previous month)  (5 Points)  [ ] Prothrombin 59002 A                                     (3 Points)               [ ] Paralysis  (less than 1 month)                             (5 Points)  [ ] Factor V Leiden                                             (3 Points)                  [ ] Multiple Trauma within 1 month                        (5 Points)  [ ] Lupus anticoagulants                                     (3 Points)                                                           [ ] Anticardiolipin antibodies                               (3 Points)                                                       [ ] High homocysteine in the blood                      (3 Points)                                             [ ] Other congenital or acquired thrombophilia      (3 Points)                                                [ ] Heparin induced thrombocytopenia                  (3 Points)                                     Total Score [     6     ]  OPIOID RISK TOOL    CRIS EACH BOX THAT APPLIES AND ADD TOTALS AT THE END    FAMILY HISTORY OF SUBSTANCE ABUSE                 FEMALE         MALE                                                Alcohol                             [  ]1 pt          [  ]3pts                                               Illegal Durgs                     [  ]2 pts        [  ]3pts                                               Rx Drugs                           [  ]4 pts        [  ]4 pts    PERSONAL HISTORY OF SUBSTANCE ABUSE                                                                                          Alcohol                             [  ]3 pts       [  ]3 pts                                               Illegal Drugs                     [  ]4 pts        [  ]4 pts                                               Rx Drugs                           [  ]5 pts        [  ]5 pts    AGE BETWEEN 16-45 YEARS                                      [  ]1 pt         [  ]1 pt    HISTORY OF PREADOLESCENT   SEXUAL ABUSE                                                             [  ]3 pts        [  ]0pts    PSYCHOLOGICAL DISEASE                     ADD, OCD, Bipolar, Schizophrenia        [  ]2 pts         [  ]2 pts                      Depression                                               [  ]1 pt           [  ]1 pt           SCORING TOTAL   (add numbers and type here)              (**0*)                                     A score of 3 or lower indicated LOW risk for future opioid abuse  A score of 4 to 7 indicated moderate risk for future opioid abuse  A score of 8 or higher indicates a high risk for opioid abuse

## 2025-01-29 NOTE — H&P PST ADULT - PROBLEM/PLAN-3
Health Maintenance Due   Topic Date Due   • DTaP/Tdap/Td Vaccine (1 - Tdap) 05/23/2012   • Traditional Medicare- Medicare Wellness Visit  10/22/2022       Patient is due for topics listed above, she wishes to proceed with MWV (Medicare Wellness Visit), but is not proceeding with Immunization(s) Dtap/Tdap/Td at this time.    DISPLAY PLAN FREE TEXT

## 2025-01-29 NOTE — H&P PST ADULT - OTHER CARE PROVIDERS
Dr. Prince [phone] Dr. Prince [phone], cardiologist Dr. Fowler Dr. Prince [phone], cardiologist Dr. Cat

## 2025-01-29 NOTE — H&P PST ADULT - HISTORY OF PRESENT ILLNESS
Pt is a 84 years old male seen today pre-op for Mitral Clip for Nonrheumatic mitral valve insufficiency  Pt is a 84 years old male seen today pre-op for Mitral Clip for Nonrheumatic mitral valve insufficiency. Pt reports he was referred by his cardiologist due to abnormal cardio imaging.(Echo and cardiac catheterization). Pt medical hx includes Medtronic cardiac pacemaker, AFib( on Eliquis), HTN, HLD, MATHIAS, CAD, OA, Constipation. Pt denies chest pain, denies SOB, denies peripheral edema, denies syncope, lightheadedness and ant other related issues. Pt scheduled for  surgery with DR. Calle on 2/14/25.

## 2025-01-30 LAB
CULTURE RESULTS: SIGNIFICANT CHANGE UP
SPECIMEN SOURCE: SIGNIFICANT CHANGE UP

## 2025-02-11 ENCOUNTER — APPOINTMENT (OUTPATIENT)
Dept: CARDIOLOGY | Facility: CLINIC | Age: 85
End: 2025-02-11
Payer: MEDICARE

## 2025-02-11 ENCOUNTER — NON-APPOINTMENT (OUTPATIENT)
Age: 85
End: 2025-02-11

## 2025-02-11 VITALS
OXYGEN SATURATION: 97 % | BODY MASS INDEX: 25.47 KG/M2 | SYSTOLIC BLOOD PRESSURE: 128 MMHG | HEART RATE: 60 BPM | WEIGHT: 188 LBS | HEIGHT: 72 IN | DIASTOLIC BLOOD PRESSURE: 64 MMHG

## 2025-02-11 DIAGNOSIS — I25.2 OLD MYOCARDIAL INFARCTION: ICD-10-CM

## 2025-02-11 DIAGNOSIS — R55 SYNCOPE AND COLLAPSE: ICD-10-CM

## 2025-02-11 DIAGNOSIS — Z95.0 PRESENCE OF CARDIAC PACEMAKER: ICD-10-CM

## 2025-02-11 DIAGNOSIS — G45.9 TRANSIENT CEREBRAL ISCHEMIC ATTACK, UNSPECIFIED: ICD-10-CM

## 2025-02-11 DIAGNOSIS — I49.5 SICK SINUS SYNDROME: ICD-10-CM

## 2025-02-11 DIAGNOSIS — R23.2 FLUSHING: ICD-10-CM

## 2025-02-11 PROBLEM — I48.91 UNSPECIFIED ATRIAL FIBRILLATION: Chronic | Status: ACTIVE | Noted: 2025-01-29

## 2025-02-11 PROBLEM — I10 ESSENTIAL (PRIMARY) HYPERTENSION: Chronic | Status: ACTIVE | Noted: 2025-01-29

## 2025-02-11 PROCEDURE — 99215 OFFICE O/P EST HI 40 MIN: CPT

## 2025-02-13 PROCEDURE — 80053 COMPREHEN METABOLIC PANEL: CPT

## 2025-02-13 PROCEDURE — 85025 COMPLETE CBC W/AUTO DIFF WBC: CPT

## 2025-02-13 PROCEDURE — 86900 BLOOD TYPING SEROLOGIC ABO: CPT

## 2025-02-13 PROCEDURE — 84443 ASSAY THYROID STIM HORMONE: CPT

## 2025-02-13 PROCEDURE — 83036 HEMOGLOBIN GLYCOSYLATED A1C: CPT

## 2025-02-13 PROCEDURE — 84480 ASSAY TRIIODOTHYRONINE (T3): CPT

## 2025-02-13 PROCEDURE — 84134 ASSAY OF PREALBUMIN: CPT

## 2025-02-13 PROCEDURE — 84436 ASSAY OF TOTAL THYROXINE: CPT

## 2025-02-13 PROCEDURE — 71046 X-RAY EXAM CHEST 2 VIEWS: CPT

## 2025-02-13 PROCEDURE — 81003 URINALYSIS AUTO W/O SCOPE: CPT

## 2025-02-13 PROCEDURE — 93005 ELECTROCARDIOGRAM TRACING: CPT

## 2025-02-13 PROCEDURE — 85610 PROTHROMBIN TIME: CPT

## 2025-02-13 PROCEDURE — 87641 MR-STAPH DNA AMP PROBE: CPT

## 2025-02-13 PROCEDURE — 85730 THROMBOPLASTIN TIME PARTIAL: CPT

## 2025-02-13 PROCEDURE — 86901 BLOOD TYPING SEROLOGIC RH(D): CPT

## 2025-02-13 PROCEDURE — 87640 STAPH A DNA AMP PROBE: CPT

## 2025-02-13 PROCEDURE — 87086 URINE CULTURE/COLONY COUNT: CPT

## 2025-02-13 PROCEDURE — 36415 COLL VENOUS BLD VENIPUNCTURE: CPT

## 2025-02-13 PROCEDURE — 83880 ASSAY OF NATRIURETIC PEPTIDE: CPT

## 2025-02-13 PROCEDURE — G0463: CPT

## 2025-02-13 PROCEDURE — 86923 COMPATIBILITY TEST ELECTRIC: CPT

## 2025-02-13 PROCEDURE — 86850 RBC ANTIBODY SCREEN: CPT

## 2025-02-13 NOTE — ASU PATIENT PROFILE, ADULT - NSICDXPASTMEDICALHX_GEN_ALL_CORE_FT
PAST MEDICAL HISTORY:  Afib     Cardiac pacemaker     H/O heart failure     Hypertension     Nonrheumatic mitral valve insufficiency

## 2025-02-14 ENCOUNTER — APPOINTMENT (OUTPATIENT)
Dept: CARDIOTHORACIC SURGERY | Facility: HOSPITAL | Age: 85
End: 2025-02-14

## 2025-02-14 ENCOUNTER — INPATIENT (INPATIENT)
Facility: HOSPITAL | Age: 85
LOS: 0 days | Discharge: HOME | DRG: 307 | End: 2025-02-14
Attending: THORACIC SURGERY (CARDIOTHORACIC VASCULAR SURGERY) | Admitting: THORACIC SURGERY (CARDIOTHORACIC VASCULAR SURGERY)
Payer: MEDICARE

## 2025-02-14 ENCOUNTER — TRANSCRIPTION ENCOUNTER (OUTPATIENT)
Age: 85
End: 2025-02-14

## 2025-02-14 ENCOUNTER — RESULT REVIEW (OUTPATIENT)
Age: 85
End: 2025-02-14

## 2025-02-14 VITALS
TEMPERATURE: 98 F | RESPIRATION RATE: 18 BRPM | OXYGEN SATURATION: 98 % | HEART RATE: 60 BPM | DIASTOLIC BLOOD PRESSURE: 63 MMHG | SYSTOLIC BLOOD PRESSURE: 126 MMHG

## 2025-02-14 VITALS
HEART RATE: 59 BPM | DIASTOLIC BLOOD PRESSURE: 72 MMHG | RESPIRATION RATE: 16 BRPM | SYSTOLIC BLOOD PRESSURE: 151 MMHG | OXYGEN SATURATION: 100 % | TEMPERATURE: 97 F

## 2025-02-14 DIAGNOSIS — I34.0 NONRHEUMATIC MITRAL (VALVE) INSUFFICIENCY: ICD-10-CM

## 2025-02-14 DIAGNOSIS — Z96.641 PRESENCE OF RIGHT ARTIFICIAL HIP JOINT: Chronic | ICD-10-CM

## 2025-02-14 DIAGNOSIS — Z90.49 ACQUIRED ABSENCE OF OTHER SPECIFIED PARTS OF DIGESTIVE TRACT: Chronic | ICD-10-CM

## 2025-02-14 LAB
ALBUMIN SERPL ELPH-MCNC: 3.5 G/DL — SIGNIFICANT CHANGE UP (ref 3.3–5.2)
ALP SERPL-CCNC: 87 U/L — SIGNIFICANT CHANGE UP (ref 40–120)
ALT FLD-CCNC: 17 U/L — SIGNIFICANT CHANGE UP
ANION GAP SERPL CALC-SCNC: 11 MMOL/L — SIGNIFICANT CHANGE UP (ref 5–17)
APTT BLD: 36.9 SEC — HIGH (ref 24.5–35.6)
APTT BLD: 37.3 SEC — HIGH (ref 24.5–35.6)
AST SERPL-CCNC: 22 U/L — SIGNIFICANT CHANGE UP
BASE EXCESS BLDA CALC-SCNC: 1.6 MMOL/L — SIGNIFICANT CHANGE UP (ref -2–3)
BASOPHILS # BLD AUTO: 0.03 K/UL — SIGNIFICANT CHANGE UP (ref 0–0.2)
BASOPHILS NFR BLD AUTO: 0.6 % — SIGNIFICANT CHANGE UP (ref 0–2)
BILIRUB SERPL-MCNC: 1 MG/DL — SIGNIFICANT CHANGE UP (ref 0.4–2)
BUN SERPL-MCNC: 13.9 MG/DL — SIGNIFICANT CHANGE UP (ref 8–20)
CA-I BLDA-SCNC: 1.21 MMOL/L — SIGNIFICANT CHANGE UP (ref 1.15–1.33)
CALCIUM SERPL-MCNC: 8.4 MG/DL — SIGNIFICANT CHANGE UP (ref 8.4–10.5)
CHLORIDE BLDA-SCNC: 107 MMOL/L — SIGNIFICANT CHANGE UP (ref 96–108)
CHLORIDE SERPL-SCNC: 106 MMOL/L — SIGNIFICANT CHANGE UP (ref 96–108)
CO2 SERPL-SCNC: 23 MMOL/L — SIGNIFICANT CHANGE UP (ref 22–29)
COHGB MFR BLDA: 1.9 % — SIGNIFICANT CHANGE UP
CREAT SERPL-MCNC: 0.66 MG/DL — SIGNIFICANT CHANGE UP (ref 0.5–1.3)
EGFR: 92 ML/MIN/1.73M2 — SIGNIFICANT CHANGE UP
EOSINOPHIL # BLD AUTO: 0.18 K/UL — SIGNIFICANT CHANGE UP (ref 0–0.5)
EOSINOPHIL NFR BLD AUTO: 3.8 % — SIGNIFICANT CHANGE UP (ref 0–6)
FIBRINOGEN PPP-MCNC: 279 MG/DL — SIGNIFICANT CHANGE UP (ref 200–450)
GAS PNL BLDA: SIGNIFICANT CHANGE UP
GLUCOSE BLDA-MCNC: 114 MG/DL — HIGH (ref 70–99)
GLUCOSE SERPL-MCNC: 106 MG/DL — HIGH (ref 70–99)
HCO3 BLDA-SCNC: 26 MMOL/L — SIGNIFICANT CHANGE UP (ref 21–28)
HCT VFR BLD CALC: 40.9 % — SIGNIFICANT CHANGE UP (ref 39–50)
HCT VFR BLDA CALC: 44 % — SIGNIFICANT CHANGE UP
HGB BLD-MCNC: 13.7 G/DL — SIGNIFICANT CHANGE UP (ref 13–17)
HGB BLDA-MCNC: 14.8 G/DL — SIGNIFICANT CHANGE UP (ref 12.6–17.4)
IMM GRANULOCYTES # BLD AUTO: 0.01 K/UL — SIGNIFICANT CHANGE UP (ref 0–0.07)
IMM GRANULOCYTES NFR BLD AUTO: 0.2 % — SIGNIFICANT CHANGE UP (ref 0–0.9)
INR BLD: 1.13 RATIO — SIGNIFICANT CHANGE UP (ref 0.85–1.16)
INR BLD: 1.21 RATIO — HIGH (ref 0.85–1.16)
LACTATE BLDA-MCNC: 1.6 MMOL/L — SIGNIFICANT CHANGE UP (ref 0.5–2)
LYMPHOCYTES # BLD AUTO: 0.7 K/UL — LOW (ref 1–3.3)
LYMPHOCYTES NFR BLD AUTO: 14.6 % — SIGNIFICANT CHANGE UP (ref 13–44)
MAGNESIUM SERPL-MCNC: 1.8 MG/DL — SIGNIFICANT CHANGE UP (ref 1.6–2.6)
MCHC RBC-ENTMCNC: 30.2 PG — SIGNIFICANT CHANGE UP (ref 27–34)
MCHC RBC-ENTMCNC: 33.5 G/DL — SIGNIFICANT CHANGE UP (ref 32–36)
MCV RBC AUTO: 90.1 FL — SIGNIFICANT CHANGE UP (ref 80–100)
METHGB MFR BLDA: 0.7 % — SIGNIFICANT CHANGE UP
MONOCYTES # BLD AUTO: 0.3 K/UL — SIGNIFICANT CHANGE UP (ref 0–0.9)
MONOCYTES NFR BLD AUTO: 6.3 % — SIGNIFICANT CHANGE UP (ref 2–14)
NEUTROPHILS # BLD AUTO: 3.56 K/UL — SIGNIFICANT CHANGE UP (ref 1.8–7.4)
NEUTROPHILS NFR BLD AUTO: 74.5 % — SIGNIFICANT CHANGE UP (ref 43–77)
NRBC # BLD AUTO: 0 K/UL — SIGNIFICANT CHANGE UP (ref 0–0)
NRBC # FLD: 0 K/UL — SIGNIFICANT CHANGE UP (ref 0–0)
NRBC BLD AUTO-RTO: 0 /100 WBCS — SIGNIFICANT CHANGE UP (ref 0–0)
OXYHGB MFR BLDA: 96 % — HIGH (ref 90–95)
PCO2 BLDA: 38 MMHG — SIGNIFICANT CHANGE UP (ref 35–48)
PH BLDA: 7.44 — SIGNIFICANT CHANGE UP (ref 7.35–7.45)
PHOSPHATE SERPL-MCNC: 2.6 MG/DL — SIGNIFICANT CHANGE UP (ref 2.4–4.7)
PLATELET # BLD AUTO: 155 K/UL — SIGNIFICANT CHANGE UP (ref 150–400)
PMV BLD: 9.3 FL — SIGNIFICANT CHANGE UP (ref 7–13)
PO2 BLDA: 88 MMHG — SIGNIFICANT CHANGE UP (ref 83–108)
POTASSIUM BLDA-SCNC: 4 MMOL/L — SIGNIFICANT CHANGE UP (ref 3.5–5.1)
POTASSIUM SERPL-MCNC: 3.7 MMOL/L — SIGNIFICANT CHANGE UP (ref 3.5–5.3)
POTASSIUM SERPL-MCNC: 4.4 MMOL/L — SIGNIFICANT CHANGE UP (ref 3.5–5.3)
POTASSIUM SERPL-SCNC: 3.7 MMOL/L — SIGNIFICANT CHANGE UP (ref 3.5–5.3)
POTASSIUM SERPL-SCNC: 4.4 MMOL/L — SIGNIFICANT CHANGE UP (ref 3.5–5.3)
PROT SERPL-MCNC: 6 G/DL — LOW (ref 6.6–8.7)
PROTHROM AB SERPL-ACNC: 12.7 SEC — SIGNIFICANT CHANGE UP (ref 9.9–13.4)
PROTHROM AB SERPL-ACNC: 13.6 SEC — HIGH (ref 9.9–13.4)
RBC # BLD: 4.54 M/UL — SIGNIFICANT CHANGE UP (ref 4.2–5.8)
RBC # FLD: 13.7 % — SIGNIFICANT CHANGE UP (ref 10.3–14.5)
SAO2 % BLDA: 99.1 % — HIGH (ref 94–98)
SODIUM BLDA-SCNC: 139 MMOL/L — SIGNIFICANT CHANGE UP (ref 136–145)
SODIUM SERPL-SCNC: 140 MMOL/L — SIGNIFICANT CHANGE UP (ref 135–145)
WBC # BLD: 4.78 K/UL — SIGNIFICANT CHANGE UP (ref 3.8–10.5)
WBC # FLD AUTO: 4.78 K/UL — SIGNIFICANT CHANGE UP (ref 3.8–10.5)

## 2025-02-14 PROCEDURE — 82330 ASSAY OF CALCIUM: CPT

## 2025-02-14 PROCEDURE — 82803 BLOOD GASES ANY COMBINATION: CPT

## 2025-02-14 PROCEDURE — C1893: CPT

## 2025-02-14 PROCEDURE — 80053 COMPREHEN METABOLIC PANEL: CPT

## 2025-02-14 PROCEDURE — 82435 ASSAY OF BLOOD CHLORIDE: CPT

## 2025-02-14 PROCEDURE — 84100 ASSAY OF PHOSPHORUS: CPT

## 2025-02-14 PROCEDURE — 85730 THROMBOPLASTIN TIME PARTIAL: CPT

## 2025-02-14 PROCEDURE — 83605 ASSAY OF LACTIC ACID: CPT

## 2025-02-14 PROCEDURE — 85384 FIBRINOGEN ACTIVITY: CPT

## 2025-02-14 PROCEDURE — 82947 ASSAY GLUCOSE BLOOD QUANT: CPT

## 2025-02-14 PROCEDURE — 84295 ASSAY OF SERUM SODIUM: CPT

## 2025-02-14 PROCEDURE — 83735 ASSAY OF MAGNESIUM: CPT

## 2025-02-14 PROCEDURE — C9399: CPT

## 2025-02-14 PROCEDURE — 85014 HEMATOCRIT: CPT

## 2025-02-14 PROCEDURE — 85610 PROTHROMBIN TIME: CPT

## 2025-02-14 PROCEDURE — 36415 COLL VENOUS BLD VENIPUNCTURE: CPT

## 2025-02-14 PROCEDURE — 93010 ELECTROCARDIOGRAM REPORT: CPT

## 2025-02-14 PROCEDURE — C1769: CPT

## 2025-02-14 PROCEDURE — 84132 ASSAY OF SERUM POTASSIUM: CPT

## 2025-02-14 PROCEDURE — 85025 COMPLETE CBC W/AUTO DIFF WBC: CPT

## 2025-02-14 PROCEDURE — 85018 HEMOGLOBIN: CPT

## 2025-02-14 PROCEDURE — 93279 PRGRMG DEV EVAL PM/LDLS PM: CPT | Mod: 26

## 2025-02-14 PROCEDURE — C1894: CPT

## 2025-02-14 PROCEDURE — 93320 DOPPLER ECHO COMPLETE: CPT

## 2025-02-14 PROCEDURE — C1889: CPT

## 2025-02-14 PROCEDURE — 93325 DOPPLER ECHO COLOR FLOW MAPG: CPT

## 2025-02-14 PROCEDURE — 93005 ELECTROCARDIOGRAM TRACING: CPT

## 2025-02-14 DEVICE — NDL NRG RF 71CM TRANSSEPTAL ADLT CRV C1: Type: IMPLANTABLE DEVICE | Status: FUNCTIONAL

## 2025-02-14 DEVICE — WIRE GD SAFARI2 275CM SML CRV BX/5: Type: IMPLANTABLE DEVICE | Status: FUNCTIONAL

## 2025-02-14 DEVICE — INTRO FASTCATH TRANSSEPTAL SWARTZ SL1 8.5FRX63CM: Type: IMPLANTABLE DEVICE | Status: FUNCTIONAL

## 2025-02-14 DEVICE — INTRODUCER SHEATH 0.038" X 14FR X 30CM: Type: IMPLANTABLE DEVICE | Status: FUNCTIONAL

## 2025-02-14 DEVICE — KIT A-LINE 1LUM 20G X 12CM SAFE KIT: Type: IMPLANTABLE DEVICE | Status: FUNCTIONAL

## 2025-02-14 RX ORDER — DAPAGLIFLOZIN 5 MG/1
5 TABLET, FILM COATED ORAL DAILY
Refills: 0 | Status: DISCONTINUED | OUTPATIENT
Start: 2025-02-14 | End: 2025-02-14

## 2025-02-14 RX ORDER — ROSUVASTATIN CALCIUM 10 MG/1
5 TABLET, FILM COATED ORAL AT BEDTIME
Refills: 0 | Status: DISCONTINUED | OUTPATIENT
Start: 2025-02-14 | End: 2025-02-14

## 2025-02-14 RX ORDER — ANTISEPTIC SURGICAL SCRUB 0.04 MG/ML
15 SOLUTION TOPICAL EVERY 12 HOURS
Refills: 0 | Status: DISCONTINUED | OUTPATIENT
Start: 2025-02-14 | End: 2025-02-14

## 2025-02-14 RX ORDER — POTASSIUM CHLORIDE 750 MG/1
10 TABLET, EXTENDED RELEASE ORAL
Refills: 0 | Status: DISCONTINUED | OUTPATIENT
Start: 2025-02-14 | End: 2025-02-14

## 2025-02-14 RX ORDER — MONTELUKAST SODIUM 5 MG/1
10 TABLET, CHEWABLE ORAL DAILY
Refills: 0 | Status: DISCONTINUED | OUTPATIENT
Start: 2025-02-14 | End: 2025-02-14

## 2025-02-14 RX ORDER — METOPROLOL SUCCINATE 25 MG
50 TABLET, EXTENDED RELEASE 24 HR ORAL DAILY
Refills: 0 | Status: DISCONTINUED | OUTPATIENT
Start: 2025-02-14 | End: 2025-02-14

## 2025-02-14 RX ORDER — SACUBITRIL AND VALSARTAN 49; 51 MG/1; MG/1
1 TABLET, FILM COATED ORAL
Refills: 0 | Status: DISCONTINUED | OUTPATIENT
Start: 2025-02-14 | End: 2025-02-14

## 2025-02-14 RX ORDER — APIXABAN 5 MG/1
5 TABLET, FILM COATED ORAL
Refills: 0 | Status: DISCONTINUED | OUTPATIENT
Start: 2025-02-14 | End: 2025-02-14

## 2025-02-14 RX ORDER — BACTERIOSTATIC SODIUM CHLORIDE 0.9 %
10 VIAL (ML) INJECTION
Refills: 0 | Status: DISCONTINUED | OUTPATIENT
Start: 2025-02-14 | End: 2025-02-14

## 2025-02-14 NOTE — DISCHARGE NOTE PROVIDER - NSDCFUADDINST_GEN_ALL_CORE_FT
Please call the Cardiothoracic Surgery office at [phone] if you are experiencing any shortness of breath, chest pain, fevers or chills, drainage from the incisions, persistent nausea, vomiting or if you have any questions about your medications. If the symptoms are severe, call 911 and go to the nearest hospital.

## 2025-02-14 NOTE — DISCHARGE NOTE PROVIDER - NSDCFUSCHEDAPPT_GEN_ALL_CORE_FT
Valentino, Patrick P  [facility]  CARDIOLOGY [address]  Scheduled Appointment: 02/25/2025    [facility]  CARDIOLOGY [address]  Scheduled Appointment: 03/14/2025

## 2025-02-14 NOTE — DISCHARGE NOTE PROVIDER - CARE PROVIDER_API CALL
Valentino, Patrick Paul  Cardiology  [address] [place] [id number]  Phone: [phone]  Fax: [phone]  Follow Up Time:     Shaye Gay  Adv Heart Fail [facility] Cardio  [address] [place] [id number]  Phone: [phone]  Fax: [phone]  Established Patient  Follow Up Time:

## 2025-02-14 NOTE — DISCHARGE NOTE PROVIDER - NSDCCPCAREPLAN_GEN_ALL_CORE_FT
PRINCIPAL DISCHARGE DIAGNOSIS  Diagnosis: Nonrheumatic mitral valve insufficiency  Assessment and Plan of Treatment: Your mitral valve insufficiency improved from severe to mild, no intervention with a mitral clip was necessary at this time.   Please continue taking all your current home medications and follow up with your cardiologist and heart failure.      SECONDARY DISCHARGE DIAGNOSES  Diagnosis: Afib  Assessment and Plan of Treatment: Continue home medications. Follow up with your cardiologist.

## 2025-02-14 NOTE — DISCHARGE NOTE PROVIDER - CARE PROVIDERS DIRECT ADDRESSES
[email or web address]

## 2025-02-14 NOTE — BRIEF OPERATIVE NOTE - OPERATION/FINDINGS
Intraoperative pre-procedure DAMON findings: EF improved to 40-45%, Right ventricular function normalized and the mitral regurgitation was only mild at a systolic BP of 150mmHg. Findings discussed with the structural heart team and heart failure, decision made not to proceed with MitraClip. Patient extubated and transferred to [address] [place] [address].

## 2025-02-14 NOTE — DISCHARGE NOTE PROVIDER - NSDCMRMEDTOKEN_GEN_ALL_CORE_FT
Claritin 24 Hour Allergy 10 mg oral tablet: 1 tab(s) orally once a day  CoQ10 100 mg oral capsule: 1 cap(s) orally once a day  Eliquis 5 mg oral tablet: 1 tab(s) orally 2 times a day  Entresto 24 mg-26 mg oral tablet: 1 tab(s) orally 2 times a day  Farxiga 5 mg oral tablet: 1 tab(s) orally once a day  finasteride 5 mg oral tablet: 1 tab(s) orally once a day  furosemide 20 mg oral tablet: 1 tab(s) orally once a day  metoprolol succinate 50 mg oral tablet, extended release: 1 tab(s) orally once a day  montelukast 10 mg oral tablet: 1 tab(s) orally once a day  omega-3 polyunsaturated fatty acids: 1,200 milligram(s) once a day  rosuvastatin 5 mg oral capsule: 1 cap(s) orally once a day (at bedtime)  Trelegy Ellipta 100 mcg-62.5 mcg-25 mcg/inh inhalation powder: 1 puff(s) inhaled once a day  Vitamin C 500 mg oral tablet, chewable: 1 tab(s) chewed once a day  zinc (as gluconate) 30 mg oral tablet: 1 tab(s) orally once a day

## 2025-02-14 NOTE — BRIEF OPERATIVE NOTE - NSICDXBRIEFPOSTOP_GEN_ALL_CORE_FT
POST-OP DIAGNOSIS:  Mild mitral regurgitation 14-Feb-2025 13:37:01  Augie Daley  Chronic systolic CHF (congestive heart failure), NYHA class 2 14-Feb-2025 13:37:37  Augie Daley

## 2025-02-14 NOTE — BRIEF OPERATIVE NOTE - NSICDXBRIEFPREOP_GEN_ALL_CORE_FT
PRE-OP DIAGNOSIS:  Severe mitral regurgitation 14-Feb-2025 13:36:15  Augie Daley  Chronic systolic CHF (congestive heart failure), NYHA class 2 14-Feb-2025 13:37:22  Augie Daley

## 2025-02-14 NOTE — DISCHARGE NOTE PROVIDER - HOSPITAL COURSE
Pt is a 84 years old male with PMH Medtronic cardiac pacemaker, AFib( on Eliquis), HTN, HLD, MATHIAS, CAD, OA, Constipation. Pt was referred to Dr. Calle by his cardiologist secondary to severe MR with severely reduced EF seen on echocardiogram in September 2024. Pt was scheduled on 2/14/25 for a Mitral Clip procedure with Dr. Calle. Pt presented today for his mitral clip procedure, intraoperative pre-procedure DAMON findings: EF improved to 40-45%, Right ventricular function normalized and the mitral regurgitation was only mild at a systolic BP of 150mmHg. Findings were discussed with the structural heart team and heart failure, decision made not to proceed with MitraClip. Patient extubated in the OR and transferred to [address]. Patient remains stable from a hemodynamic and respiratory standpoint. Patient deemed stable for discharge home per Dr. Calle with heart failure & cardiology follow up.

## 2025-02-14 NOTE — DISCHARGE NOTE NURSING/CASE MANAGEMENT/SOCIAL WORK - PATIENT PORTAL LINK FT
You can access the FollowMyHealth Patient Portal offered by [facility] by registering at the following website: [email or web address]. By joining [employer]’s FollowMyHealth portal, you will also be able to view your health information using other applications (apps) compatible with our system.

## 2025-02-14 NOTE — DISCHARGE NOTE NURSING/CASE MANAGEMENT/SOCIAL WORK - FINANCIAL ASSISTANCE
[facility] provides services at a reduced cost to those who are determined to be eligible through [facility]’s financial assistance program. Information regarding [facility]’s financial assistance program can be found by going to [email or web address] or by calling [phone].

## 2025-02-14 NOTE — DISCHARGE NOTE PROVIDER - NSDCCPTREATMENT_GEN_ALL_CORE_FT
PRINCIPAL PROCEDURE  Procedure: DAMON (transesophageal echocardiography)  Findings and Treatment:       SECONDARY PROCEDURE  Procedure: Endotracheal intubation  Findings and Treatment:

## 2025-02-25 ENCOUNTER — APPOINTMENT (OUTPATIENT)
Dept: CARDIOLOGY | Facility: CLINIC | Age: 85
End: 2025-02-25
Payer: MEDICARE

## 2025-02-25 VITALS
HEART RATE: 58 BPM | HEIGHT: 72 IN | WEIGHT: 184 LBS | DIASTOLIC BLOOD PRESSURE: 70 MMHG | OXYGEN SATURATION: 98 % | SYSTOLIC BLOOD PRESSURE: 134 MMHG | BODY MASS INDEX: 24.92 KG/M2

## 2025-02-25 DIAGNOSIS — R06.09 OTHER FORMS OF DYSPNEA: ICD-10-CM

## 2025-02-25 DIAGNOSIS — I25.10 ATHEROSCLEROTIC HEART DISEASE OF NATIVE CORONARY ARTERY W/OUT ANGINA PECTORIS: ICD-10-CM

## 2025-02-25 DIAGNOSIS — I47.29 OTHER VENTRICULAR TACHYCARDIA: ICD-10-CM

## 2025-02-25 DIAGNOSIS — I42.8 OTHER CARDIOMYOPATHIES: ICD-10-CM

## 2025-02-25 DIAGNOSIS — I50.1 LEFT VENTRICULAR FAILURE, UNSPECIFIED: ICD-10-CM

## 2025-02-25 DIAGNOSIS — R26.9 UNSPECIFIED ABNORMALITIES OF GAIT AND MOBILITY: ICD-10-CM

## 2025-02-25 DIAGNOSIS — R00.1 BRADYCARDIA, UNSPECIFIED: ICD-10-CM

## 2025-02-25 DIAGNOSIS — I48.91 UNSPECIFIED ATRIAL FIBRILLATION: ICD-10-CM

## 2025-02-25 DIAGNOSIS — R55 SYNCOPE AND COLLAPSE: ICD-10-CM

## 2025-02-25 DIAGNOSIS — I10 ESSENTIAL (PRIMARY) HYPERTENSION: ICD-10-CM

## 2025-02-25 PROBLEM — Z86.79 PERSONAL HISTORY OF OTHER DISEASES OF THE CIRCULATORY SYSTEM: Chronic | Status: ACTIVE | Noted: 2025-01-29

## 2025-02-25 PROBLEM — I34.0 NONRHEUMATIC MITRAL (VALVE) INSUFFICIENCY: Chronic | Status: ACTIVE | Noted: 2025-01-29

## 2025-02-25 PROCEDURE — 99215 OFFICE O/P EST HI 40 MIN: CPT

## 2025-03-12 ENCOUNTER — APPOINTMENT (OUTPATIENT)
Dept: CARDIOLOGY | Facility: CLINIC | Age: 85
End: 2025-03-12
Payer: MEDICARE

## 2025-03-12 ENCOUNTER — NON-APPOINTMENT (OUTPATIENT)
Age: 85
End: 2025-03-12

## 2025-03-12 VITALS
OXYGEN SATURATION: 97 % | HEART RATE: 60 BPM | HEIGHT: 72 IN | DIASTOLIC BLOOD PRESSURE: 70 MMHG | WEIGHT: 190 LBS | BODY MASS INDEX: 25.73 KG/M2 | SYSTOLIC BLOOD PRESSURE: 128 MMHG

## 2025-03-12 DIAGNOSIS — I50.1 LEFT VENTRICULAR FAILURE, UNSPECIFIED: ICD-10-CM

## 2025-03-12 DIAGNOSIS — I48.91 UNSPECIFIED ATRIAL FIBRILLATION: ICD-10-CM

## 2025-03-12 DIAGNOSIS — I50.20 UNSPECIFIED SYSTOLIC (CONGESTIVE) HEART FAILURE: ICD-10-CM

## 2025-03-12 DIAGNOSIS — I34.0 NONRHEUMATIC MITRAL (VALVE) INSUFFICIENCY: ICD-10-CM

## 2025-03-12 DIAGNOSIS — I47.29 OTHER VENTRICULAR TACHYCARDIA: ICD-10-CM

## 2025-03-12 PROCEDURE — 93000 ELECTROCARDIOGRAM COMPLETE: CPT

## 2025-03-12 PROCEDURE — G2211 COMPLEX E/M VISIT ADD ON: CPT

## 2025-03-12 PROCEDURE — 99215 OFFICE O/P EST HI 40 MIN: CPT

## 2025-03-12 RX ORDER — FLUTICASONE FUROATE, UMECLIDINIUM BROMIDE AND VILANTEROL TRIFENATATE 100; 62.5; 25 UG/1; UG/1; UG/1
100-62.5-25 POWDER RESPIRATORY (INHALATION) DAILY
Refills: 0 | Status: ACTIVE | COMMUNITY

## 2025-03-14 ENCOUNTER — APPOINTMENT (OUTPATIENT)
Dept: CARDIOLOGY | Facility: CLINIC | Age: 85
End: 2025-03-14
Payer: MEDICARE

## 2025-03-14 ENCOUNTER — NON-APPOINTMENT (OUTPATIENT)
Age: 85
End: 2025-03-14

## 2025-03-14 PROCEDURE — 93296 REM INTERROG EVL PM/IDS: CPT

## 2025-03-14 PROCEDURE — 93294 REM INTERROG EVL PM/LDLS PM: CPT

## 2025-04-01 ENCOUNTER — APPOINTMENT (OUTPATIENT)
Dept: CARDIOLOGY | Facility: CLINIC | Age: 85
End: 2025-04-01
Payer: MEDICARE

## 2025-04-01 VITALS
WEIGHT: 188 LBS | OXYGEN SATURATION: 98 % | BODY MASS INDEX: 25.47 KG/M2 | HEART RATE: 51 BPM | SYSTOLIC BLOOD PRESSURE: 144 MMHG | HEIGHT: 72 IN | DIASTOLIC BLOOD PRESSURE: 76 MMHG

## 2025-04-01 DIAGNOSIS — I48.91 UNSPECIFIED ATRIAL FIBRILLATION: ICD-10-CM

## 2025-04-01 DIAGNOSIS — E78.2 MIXED HYPERLIPIDEMIA: ICD-10-CM

## 2025-04-01 DIAGNOSIS — R00.1 BRADYCARDIA, UNSPECIFIED: ICD-10-CM

## 2025-04-01 DIAGNOSIS — R55 SYNCOPE AND COLLAPSE: ICD-10-CM

## 2025-04-01 DIAGNOSIS — I50.20 UNSPECIFIED SYSTOLIC (CONGESTIVE) HEART FAILURE: ICD-10-CM

## 2025-04-01 DIAGNOSIS — I10 ESSENTIAL (PRIMARY) HYPERTENSION: ICD-10-CM

## 2025-04-01 DIAGNOSIS — I25.10 ATHEROSCLEROTIC HEART DISEASE OF NATIVE CORONARY ARTERY W/OUT ANGINA PECTORIS: ICD-10-CM

## 2025-04-01 DIAGNOSIS — I50.1 LEFT VENTRICULAR FAILURE, UNSPECIFIED: ICD-10-CM

## 2025-04-01 DIAGNOSIS — R26.9 UNSPECIFIED ABNORMALITIES OF GAIT AND MOBILITY: ICD-10-CM

## 2025-04-01 DIAGNOSIS — I34.0 NONRHEUMATIC MITRAL (VALVE) INSUFFICIENCY: ICD-10-CM

## 2025-04-01 DIAGNOSIS — R06.09 OTHER FORMS OF DYSPNEA: ICD-10-CM

## 2025-04-01 PROCEDURE — 99215 OFFICE O/P EST HI 40 MIN: CPT

## 2025-04-08 ENCOUNTER — NON-APPOINTMENT (OUTPATIENT)
Age: 85
End: 2025-04-08

## 2025-06-16 ENCOUNTER — APPOINTMENT (OUTPATIENT)
Dept: CARDIOLOGY | Facility: CLINIC | Age: 85
End: 2025-06-16

## 2025-06-16 PROCEDURE — 93294 REM INTERROG EVL PM/LDLS PM: CPT

## 2025-06-16 PROCEDURE — 93296 REM INTERROG EVL PM/IDS: CPT

## 2025-07-09 ENCOUNTER — APPOINTMENT (OUTPATIENT)
Dept: CARDIOLOGY | Facility: CLINIC | Age: 85
End: 2025-07-09
Payer: MEDICARE

## 2025-07-09 VITALS
SYSTOLIC BLOOD PRESSURE: 136 MMHG | WEIGHT: 191 LBS | HEIGHT: 72 IN | BODY MASS INDEX: 25.87 KG/M2 | DIASTOLIC BLOOD PRESSURE: 70 MMHG | HEART RATE: 60 BPM | OXYGEN SATURATION: 97 %

## 2025-07-09 PROCEDURE — 99215 OFFICE O/P EST HI 40 MIN: CPT

## 2025-07-18 ENCOUNTER — NON-APPOINTMENT (OUTPATIENT)
Age: 85
End: 2025-07-18

## 2025-07-18 ENCOUNTER — APPOINTMENT (OUTPATIENT)
Dept: OPHTHALMOLOGY | Facility: CLINIC | Age: 85
End: 2025-07-18
Payer: MEDICARE

## 2025-07-18 PROCEDURE — 92014 COMPRE OPH EXAM EST PT 1/>: CPT

## 2025-09-17 ENCOUNTER — APPOINTMENT (OUTPATIENT)
Dept: CARDIOLOGY | Facility: CLINIC | Age: 85
End: 2025-09-17
Payer: MEDICARE

## 2025-09-17 VITALS
DIASTOLIC BLOOD PRESSURE: 82 MMHG | BODY MASS INDEX: 25.73 KG/M2 | WEIGHT: 190 LBS | OXYGEN SATURATION: 100 % | HEIGHT: 72 IN | HEART RATE: 58 BPM | SYSTOLIC BLOOD PRESSURE: 128 MMHG

## 2025-09-17 DIAGNOSIS — I45.10 UNSPECIFIED RIGHT BUNDLE-BRANCH BLOCK: ICD-10-CM

## 2025-09-17 DIAGNOSIS — I48.91 UNSPECIFIED ATRIAL FIBRILLATION: ICD-10-CM

## 2025-09-17 DIAGNOSIS — I47.29 OTHER VENTRICULAR TACHYCARDIA: ICD-10-CM

## 2025-09-17 DIAGNOSIS — R00.1 BRADYCARDIA, UNSPECIFIED: ICD-10-CM

## 2025-09-17 DIAGNOSIS — Z95.0 PRESENCE OF CARDIAC PACEMAKER: ICD-10-CM

## 2025-09-17 DIAGNOSIS — R55 SYNCOPE AND COLLAPSE: ICD-10-CM

## 2025-09-17 DIAGNOSIS — I50.20 UNSPECIFIED SYSTOLIC (CONGESTIVE) HEART FAILURE: ICD-10-CM

## 2025-09-17 DIAGNOSIS — I49.5 SICK SINUS SYNDROME: ICD-10-CM

## 2025-09-17 DIAGNOSIS — I50.1 LEFT VENTRICULAR FAILURE, UNSPECIFIED: ICD-10-CM

## 2025-09-17 PROCEDURE — 93284 PRGRMG EVAL IMPLANTABLE DFB: CPT

## (undated) DEVICE — DRSG 4 X 8"

## (undated) DEVICE — SUT SILK 0 30" MH

## (undated) DEVICE — PACK BASIC GOWN

## (undated) DEVICE — GLV 7.5 PROTEXIS (WHITE)

## (undated) DEVICE — DRAPE IOBAN 33" X 23"

## (undated) DEVICE — CABLE CONN

## (undated) DEVICE — DRAPE CV 106" X 135"

## (undated) DEVICE — SYR LUER LOK 20CC

## (undated) DEVICE — VISITEC 4X4

## (undated) DEVICE — SOL IRR POUR NS 0.9% 1000ML

## (undated) DEVICE — DRSG TEGADERM 4 X 4.75"

## (undated) DEVICE — WOUND IRR IRRISEPT W 0.5 CHG

## (undated) DEVICE — DRAPE 3/4 SHEET 52X76"

## (undated) DEVICE — PACK CARD CATH CUST

## (undated) DEVICE — GOWN TRIMAX LG

## (undated) DEVICE — DRAPE C ARM UNIVERSAL

## (undated) DEVICE — PACK MINOR WITH LAP

## (undated) DEVICE — WARMING BLANKET FULL UNDERBODY

## (undated) DEVICE — DRAPE TOWEL BLUE 17" X 24"

## (undated) DEVICE — ELCTR MULTIFUNCTION DEFIBRILLATION ELECTRODE EDGE SYSTEM ADULT

## (undated) DEVICE — PRESSURE INFUSOR BAG 1000ML

## (undated) DEVICE — DRAPE DOME BAG 22"

## (undated) DEVICE — GOWN XL W TOWEL

## (undated) DEVICE — PREP CHLORAPREP HI-LITE ORANGE 26ML

## (undated) DEVICE — SOL INJ NS 0.9% 1000ML

## (undated) DEVICE — POSITIONER FOAM EGG CRATE ULNAR 2PCS (PINK)

## (undated) DEVICE — PREP SCRUB BRUSH W CHG 4%

## (undated) DEVICE — DRSG MEPILEX 10 X 30CM (4 X 12") WHITE

## (undated) DEVICE — TUBING TRUWAVE PRESSURE MALE/FEMALE 72"

## (undated) DEVICE — STOPCOCK 3 WAY W SWIVEL MALE LUER LOCK